# Patient Record
Sex: FEMALE | Race: WHITE | NOT HISPANIC OR LATINO | Employment: OTHER | URBAN - METROPOLITAN AREA
[De-identification: names, ages, dates, MRNs, and addresses within clinical notes are randomized per-mention and may not be internally consistent; named-entity substitution may affect disease eponyms.]

---

## 2018-02-28 DIAGNOSIS — M54.50 LUMBAGO: Primary | ICD-10-CM

## 2018-02-28 DIAGNOSIS — M79.7 FIBROMYALGIA: ICD-10-CM

## 2018-03-16 ENCOUNTER — CLINICAL SUPPORT (OUTPATIENT)
Dept: REHABILITATION | Facility: HOSPITAL | Age: 68
End: 2018-03-16
Attending: INTERNAL MEDICINE
Payer: MEDICARE

## 2018-03-16 DIAGNOSIS — M54.50 CHRONIC MIDLINE LOW BACK PAIN WITHOUT SCIATICA: ICD-10-CM

## 2018-03-16 DIAGNOSIS — G89.29 CHRONIC MIDLINE LOW BACK PAIN WITHOUT SCIATICA: ICD-10-CM

## 2018-03-16 DIAGNOSIS — R53.1 DECREASED STRENGTH: ICD-10-CM

## 2018-03-16 PROCEDURE — G8979 MOBILITY GOAL STATUS: HCPCS | Mod: CJ,PO

## 2018-03-16 PROCEDURE — G8978 MOBILITY CURRENT STATUS: HCPCS | Mod: CK,PO

## 2018-03-16 PROCEDURE — 97162 PT EVAL MOD COMPLEX 30 MIN: CPT | Mod: PO

## 2018-03-16 NOTE — PLAN OF CARE
"Physical Therapy Evaluation    Name: Tressa Quiroz  Clinic Number: [id number]      Diagnosis:   Encounter Diagnoses   Name Primary?    Decreased strength     Chronic midline low back pain without sciatica      Physician: Amelia Garcia*  Treatment Orders: PT Eval and Treat    Past Medical History:   Diagnosis Date    Allergy     AR (allergic rhinitis) 10/1/2012    Basal cell carcinoma of nasal tip 10/1/2012    Hair loss disorder 10/1/2012    Hypothyroidism 10/1/2012    Thyroid disease      Current Outpatient Prescriptions   Medication Sig    irbesartan (AVAPRO) 75 MG tablet     levothyroxine (TIROSINT) 13 mcg Cap Take 1 capsule by mouth every morning.    levothyroxine (TIROSINT) 25 mcg Cap Take 1 capsule by mouth every morning.     No current facility-administered medications for this visit.      Review of patient's allergies indicates:   Allergen Reactions    Vitamin d analogue      Joint pain         Evaluation Date: 3/16/18  Visit # authorized: 1/20  POC period: 3/16/18-5/11/18  G-code: 1/10          Subjective/History     Precautions: universal, ?spine precautions  Medical Diagnosis: fibromyalgia and lumbago  PMH/co-morbidities: L shoulder pain/upper back pain, osteoporosis, hypothyroid, insomnia, R hip pain, osteoporosis  PT Diagnosis: tenderness at low back, decreased strength, lack of appropriate HEP, decreased spinal mobility  Chief complaint: "I was told I have a collapsed disk and I want to know which exercises I can do safely.  History of Present Illness: Tressa is a 67 y.o. female that presents to [facility] secondary to back pain. Pt states she's had osteoporosis since 1999, and was taking medication for this. Pt denies injury. Pt states that her doctor told her 5th disc has collapsed. Pt thinks it's in her low back, and not in her thoracic spine. Pt states she is unsure if it's the disk or vertebrae, but she's only had an Xr. PT will follow up with MD for " notes and XR results, as pt is outside of [facility] system. Pt denies back pain, but states she would like to know what kind of exercises are safe for her to do. Pt reports her mobility and exercise tolerance has been limited by her insomnia, but the insomnia is improving and pt would like to be more active.    X-ray and MRI was taken and revealed: pt reports a collapsed 5th vertebrae, but unable to specify  Prior Therapy: Yes for her knee; pt found it helpful  Nutrition:  Normal    Social History: lives alone in a duplex, no stairs.  Place of Residence (Steps/Adaptations/Levels):  No stairs  Previous functional status includes: pt denies a recent change, but states she's not as active as she used to be  Current functional status:  Pt states that she is not very active with the exception of walking her dog.   She went to the gym for the first time in a while  Pt has a decrease ability to perform ADLs such as: washing her dog- she reports back pain/strain with leaning over the tub  Exercise routine prior to onset : walks her dog, lifts weights, stationary bike (pt used to go to the gym 2x/week)- hasn't really been since Nov/Dec. Pt reports she also used to go to yoga.  DME owned: none  Work:  Retired; pt used to run a                        Job description includes:  Retired; pt walks her dog. Pt states her insomnia has limited her ability to do things out of the house    Pain: no back pain; pt reports L shoulder pain (1-2/10)- pt reports occasional R hip pain (she states it's bone on bone in her hip)  Onset of pain /Mechanism of Onset:  n/a  Chief complaint:  Wants same exercises that are safe for her back  Radicular symptoms:  none  Aggravating factors:  Pt states she will get back pain if she is bent over for a prolonged time (ie washing her dog)  Easing factors:  N/a  Pain at current: denies hip or back pain at this time; 1-2/10 L shoulder pain.    Patient Goals: Pt would like exercises for her hip and back  that are safe to perform  No cultural, environmental, or spiritual barriers identified to treatment or learning.       Objective     Observation: pt received ambulating independently without a device, no major gait deviations noted. Pt is A+ O x 3, pleasant    Posture:  Rounded shoulder, forward head posture    Lumbar Range of Motion:    Degrees Pain   Flexion WNL    No        Extension WNL   no        Left Side Bending WNL No        Right Side Bending WNL No        Left rotation   WNL No        Right Rotation   7/8 range No             Lower Extremity Strength  Right LE  Left LE    Knee extension: 5/5 Knee extension: 5/5   Knee flexion: 5/5 Knee flexion: 5/5   Hip flexion: 5/5 Hip flexion: 4+/5   Hip extension:  5/5 Hip extension: 5/5   Hip abduction: 5/5 Hip abduction: 5/5   Hip adduction: 4+/5 Hip adduction 5/5   Ankle dorsiflexion: 5/5 Ankle dorsiflexion: 5/5   Ankle plantarflexion: 5/5 Ankle plantarflexion: 5/5   Hip IR (R/L): 5/5/ 4/5  Hip Er: (R/L): 5/5/5/5    Special Tests:  -Bridge test: - B  -SLR Test: -    Joint Mobility: WNL in B hips, not assessed at spine, as PT does not have pt's spinal XR results    Palpation: some tenderness to palpation over L5 spinous prcess    Sensation: light touch intact    Flexibility: good HS flexibility bilaterally     Functional Limitations Reports - G Codes  Category: Mobility  Tool: FOTO- back  Score: 59% (41% limitation)  Current: CK at least 40% < 60% impaired, limited or restricted  Goal: CJ at least 20% < 40% impaired, limited or restricted        PT Evaluation Completed? Yes  Discussed Plan of Care with patient: Yes    TREATMENT:  Pt was educated on activity modification (ie washing her dog seated, outside, and/or taking breaks from being bent over the tub while washing the dog). Pt was educated on spine precautions (to avoid excessive bending, twisting, and heavy lifting) to promote back stability. PT explained to pt that these may change once PT is able to see pt's  XR results. Pt provided with HEP of Bridge with Hip ADD, SLR, clamshell with OTB, and posterior pelvic tilt     Instructed pt. Regarding: Proper technique with all exercises. Pt demo good understanding of the education provided. Tressa demonstrated good return demonstration of activities.      Assessment     History  Co-morbidities and personal factors that may impact the plan of care Examination  Body Structures and Functions, activity limitations and participation restrictions that may impact the plan of care    Clinical Presentation   Co-morbidities:   difficulty sleeping and osteoporosis        Personal Factors:   coping style Body Regions:   back  lower extremities    Body Systems:    ROM  strength            Participation Restrictions:   none     Activity limitations:   Learning and applying knowledge  no deficits    General Tasks and Commands  no deficits    Communication  no deficits    Mobility  no deficits    Self care  no deficits    Domestic Life  no deficits    Interactions/Relationships  no deficits    Life Areas  no deficits    Community and Social Life  no deficits         Stable but unknown characteristics at this time                    moderate   moderate  moderate Decision Making/ Complexity Score:  moderate             This is a 67 y.o. female referred to outpatient physical therapy and presents with a medical diagnosis of lumbago and fibromyalgia and demonstrates limitations as described in the problem list. Pt denies back pain (apart from tenderness at spinous process and with activities like washing her dog), but she was told she has a collapsed disk and wants appropriate core, back and hip exercises to perform. Pt was unsure of XR results, so PT to follow up with referring MD for imaging results and most recent notes, so this can be factored into exercise prescription. Pt will benefit from physcial therapy services in order to maximize pain free functional independence. The following goals  were discussed with the patient and patient is in agreement with them as to be addressed in the treatment plan. Pt was given a HEP consisting of exercises listed above. Pt verbally understood the instructions as they were given and demonstrated proper form and technique during therapy. Pt was advised to perform these exercises free of pain, and to stop performing them if pain occurs. Will start PT 1x/week for 4 weeks with ultimate goal of PT being that pt feeling safe/confident to go to exercise independently at home or the gym    Medical necessity is demonstrated by the following IMPAIRMENTS/PROMBLEM LIST:    1)tenderness/back pain   2)Core/back weakness   3)Decreased spinal mobility   4)Decreased ability to participate in recreational activities   5) Lack of HEP, requires skilled supervision to complete and progress HEP          GOALS: Short Term Goals:  4 weeks  1.Report decreased    back    pain   to increase tolerance for washing her dog  2. Pt to increase R spinal rotation to WNL for improved functional mobility  3. Increased MMT 1/2 grade  to increase tolerance for ADL and recreational activities.  4. Pt to be compliant with appropriate activity modifications  5. Pt to tolerate HEP to improve ROM and independence with ADL's    Long Term Goals: 8 weeks  1.pt to demo proper body mechanics with bending/lifting to prevent future back pain/injuries  2.Increased MMT  for  1 grade  to increase tolerance for ADL and work activities.  3. Pt will report at CJ level (20-40% impaired) on  FOTO score to demonstrate decrease in disability and improvement in back pain.  4. Pt to report feeling confident to perform HEP independently at home or at the gym      Plan     Pt will be treated by physical therapy 1 time weekly for Pt Education, HEP, therapeutic exercises, neuromuscular re-education, therapeutic activities, manual therapy, joint mobilizations, and modalities PRN to achieve established goals. Tressa may at times be  seen by a PTA as part of the Rehab Team.     Cont PT for 8 weeks.      Birgit Oliver, PT  03/16/2018      I certify the need for these services furnished under this plan of treatment and while under my care.______________________________ Physician/Referring Practitioner  Date of Signature

## 2018-04-09 ENCOUNTER — CLINICAL SUPPORT (OUTPATIENT)
Dept: REHABILITATION | Facility: HOSPITAL | Age: 68
End: 2018-04-09
Attending: INTERNAL MEDICINE
Payer: MEDICARE

## 2018-04-09 DIAGNOSIS — R53.1 DECREASED STRENGTH: ICD-10-CM

## 2018-04-09 DIAGNOSIS — G89.29 CHRONIC MIDLINE LOW BACK PAIN WITHOUT SCIATICA: ICD-10-CM

## 2018-04-09 DIAGNOSIS — M54.50 CHRONIC MIDLINE LOW BACK PAIN WITHOUT SCIATICA: ICD-10-CM

## 2018-04-09 PROCEDURE — 97110 THERAPEUTIC EXERCISES: CPT | Mod: PO

## 2018-04-09 NOTE — PLAN OF CARE
"                                                    Physical Therapy Progress Note     Name: Tressa Quiroz  Clinic Number: [id number]  Diagnosis:   Encounter Diagnoses   Name Primary?    Decreased strength     Chronic midline low back pain without sciatica      Physician: Amelia Garcia*  Treatment Orders: PT Eval and Treat  Past Medical History:   Diagnosis Date    Allergy     AR (allergic rhinitis) 10/1/2012    Basal cell carcinoma of nasal tip 10/1/2012    Hair loss disorder 10/1/2012    Hypothyroidism 10/1/2012    Thyroid disease        Precautions: standard  Visit #: 2/20  Date of Eval: 3/16/18  Plan of Care Expiration: 5/11/18  G-CODE  2/10     Time in: 13:00  Time out: 14:00      Subjective   Pt reports: Pt states,"I have to apologize I forgot when my appointments, so I missed 4 of them"  Pt states, "my R hip hurts sometimes when I Lie down, and I put a pillow under my leg, and it is better."  Pain Scale:  Pt denies back pain. Pt reports R hip pain; but denies pain at this time.    Objective     Patient received individual therapy to increase strength, endurance, ROM, flexibility, posture and core stabilization with activities as follows:     Tressa received therapeutic exercises to develop strength, endurance, ROM, flexibility, posture and core stabilization for 50 minutes including:         Lower Extremity Strength  Right LE   Left LE     Knee extension: 5/5 Knee extension: 5/5   Knee flexion: 5/5 Knee flexion: 5/5   Hip flexion: 4+/5 Hip flexion: 5/5   Hip extension:  5/5 Hip extension: 5/5   Hip abduction: 5/5 Hip abduction: 5/5   Hip adduction: 5/5 Hip adduction 5/5   Ankle dorsiflexion: 5/5 Ankle dorsiflexion: 5/5   Ankle plantarflexion: 5/5 Ankle plantarflexion: 5/5   Hip IR (R/L): 5/5/ 4+/5  Hip Er: (R/L): 5/5/5/5    Recumbent bike, 8 min, Level 3  2 x 10 reps of posterior pelvic tilts, 3" holds  2 x 10 reps of supine marching with pelvic tilt  2 x 10 reps of Hip ADD squeeze with " "bridge, 3" holds  2 x 10 reps of resisted alternating hip ABD in hook-lying, OTB   10 reps of SLR c/ hip ADD bias  10 reps of SLR to Hip Abduction  DKTC c/ pull down, pink sports cord, 2 x 10 reps  In prone, 5 reps of single Ue, then single LE extension; followed by 10 reps of Alt UE and LE extension  Child's pose, 1 min  Lateral child's pose, 30"/side  Low plank on knees, 3 x 10-15" holds  Figure-4 stretch, 1 min/side  Hip flexor stretch off table, 1 min/side  2 x 10 reps of leg press on 80#  10 reps of hip flexion rotary, 25#    Written Home Exercises: Bolded exercises  Pt demo good understanding of the education provided. Tressa demonstrated good return demonstration of activities.     Education provided re: POC, HEP    Pt has no cultural, educational or language barriers to learning provided.    Assessment   Pt tolerated session well without increase in symptoms. Pt states she has missed sessions because she forgot about them, but that exercises at home have been helpful and pt would like to continue with therapy. Pt has made some improvement in LE strength since evaluation. Pt is motivated to continue making gains in strength and endurance. Will Work towards remaining and updated goals, 1x/week for 3 weeks.    Pt prognosis is Good. Pt will continue to benefit from skilled outpatient physical therapy to address the deficits listed in the problem list, provide pt/family education and to maximize pt's level of independence in the home and community environment.     Anticipated barriers to physical therapy:     Medical necessity is demonstrated by the following IMPAIRMENTS/PROBLEM LIST:      1)tenderness/back pain              2)Core/back weakness              3)Decreased spinal mobility              4)Decreased ability to participate in recreational activities              5) Lack of HEP, requires skilled supervision to complete and progress HEP                  Pt's spiritual, cultural and educational needs " considered and pt agreeable to plan of care and GOALS as stated below:      GOALS: Short Term Goals:  4 weeks  1.Report decreased    back    pain   to increase tolerance for washing her dog  2. Pt to increase R spinal rotation to WNL for improved functional mobility  3. Increased MMT 1/2 grade  to increase tolerance for ADL and recreational activities.- Goal met  4. Pt to be compliant with appropriate activity modifications  5. Pt to tolerate HEP to improve ROM and independence with ADL's     Long Term Goals: 8 weeks  1.pt to demo proper body mechanics with bending/lifting to prevent future back pain/injuries  2.Increased MMT  for  1 grade  to increase tolerance for ADL and work activities.  3. Pt will report at CJ level (20-40% impaired) on  FOTO score to demonstrate decrease in disability and improvement in back pain.  4. Pt to report feeling confident to perform HEP independently at home or at the gym            Plan   Continue PT 1x weekly under established Plan of Care, with treatment to include: pt education, HEP, therapeutic exercises, neuromuscular re-education/balance exercises, therapeutic activities, joint mobilizations, and modalities PRN, to work towards established goals. Pt may be seen by PTA to carry out plan of care.     Birgit Oliver, PT   04/09/2018

## 2018-04-09 NOTE — PROGRESS NOTES
"                                                    Physical Therapy Progress Note     Name: Tressa Quiroz  Clinic Number: [id number]  Diagnosis:   Encounter Diagnoses   Name Primary?    Decreased strength     Chronic midline low back pain without sciatica      Physician: Amelia Garcia*  Treatment Orders: PT Eval and Treat  Past Medical History:   Diagnosis Date    Allergy     AR (allergic rhinitis) 10/1/2012    Basal cell carcinoma of nasal tip 10/1/2012    Hair loss disorder 10/1/2012    Hypothyroidism 10/1/2012    Thyroid disease        Precautions: standard  Visit #: 2/20  Date of Eval: 3/16/18  Plan of Care Expiration: 5/11/18  G-CODE  2/10     Time in: 13:00  Time out: 1400      Subjective   Pt reports: Pt states,"I have to apologize I forgot when my appointments, so I missed 4 of them"  Pt states, "my R hip hurts sometimes when I Lie down, and I put a pillow under my leg, and it is better."  Pain Scale:  Pt denies back pain. Pt reports R hip pain; but denies pain at this time.    Objective     Patient received individual therapy to increase strength, endurance, ROM, flexibility, posture and core stabilization with activities as follows:     Tressa received therapeutic exercises to develop strength, endurance, ROM, flexibility, posture and core stabilization for 50 minutes including:         Lower Extremity Strength  Right LE   Left LE     Knee extension: 5/5 Knee extension: 5/5   Knee flexion: 5/5 Knee flexion: 5/5   Hip flexion: 4+/5 Hip flexion: 5/5   Hip extension:  5/5 Hip extension: 5/5   Hip abduction: 5/5 Hip abduction: 5/5   Hip adduction: 5/5 Hip adduction 5/5   Ankle dorsiflexion: 5/5 Ankle dorsiflexion: 5/5   Ankle plantarflexion: 5/5 Ankle plantarflexion: 5/5   Hip IR (R/L): 5/5/ 4+/5  Hip Er: (R/L): 5/5/5/5    Recumbent bike, 8 min, Level 3  2 x 10 reps of posterior pelvic tilts, 3" holds  2 x 10 reps of supine marching with pelvic tilt  2 x 10 reps of Hip ADD squeeze with " "bridge, 3" holds  2 x 10 reps of resisted alternating hip ABD in hook-lying, OTB   10 reps of SLR c/ hip ADD bias  10 reps of SLR to Hip Abduction  DKTC c/ pull down, pink sports cord, 2 x 10 reps  In prone, 5 reps of single Ue, then single LE extension; followed by 10 reps of Alt UE and LE extension  Child's pose, 1 min  Lateral child's pose, 30"/side  Low plank on knees, 3 x 10-15" holds  Figure-4 stretch, 1 min/side  Hip flexor stretch off table, 1 min/side  2 x 10 reps of leg press on 80#  10 reps of hip flexion rotary, 25#    Written Home Exercises: Bolded exercises  Pt demo good understanding of the education provided. Tressa demonstrated good return demonstration of activities.     Education provided re: POC, HEP    Pt has no cultural, educational or language barriers to learning provided.    Assessment   Pt tolerated session well without increase in symptoms. Pt states she has missed sessions because she forgot about them, but that exercises at home have been helpful and pt would like to continue with therapy. Pt has made some improvement in LE strength since evaluation. Pt is motivated to continue making gains in strength and endurance. Will Work towards remaining and updated goals, 1x/week for 3 weeks.    Pt prognosis is Good. Pt will continue to benefit from skilled outpatient physical therapy to address the deficits listed in the problem list, provide pt/family education and to maximize pt's level of independence in the home and community environment.     Anticipated barriers to physical therapy:     Medical necessity is demonstrated by the following IMPAIRMENTS/PROBLEM LIST:      1)tenderness/back pain              2)Core/back weakness              3)Decreased spinal mobility              4)Decreased ability to participate in recreational activities              5) Lack of HEP, requires skilled supervision to complete and progress HEP                  Pt's spiritual, cultural and educational needs " considered and pt agreeable to plan of care and GOALS as stated below:      GOALS: Short Term Goals:  4 weeks  1.Report decreased    back    pain   to increase tolerance for washing her dog  2. Pt to increase R spinal rotation to WNL for improved functional mobility  3. Increased MMT 1/2 grade  to increase tolerance for ADL and recreational activities.- Goal met  4. Pt to be compliant with appropriate activity modifications  5. Pt to tolerate HEP to improve ROM and independence with ADL's     Long Term Goals: 8 weeks  1.pt to demo proper body mechanics with bending/lifting to prevent future back pain/injuries  2.Increased MMT  for  1 grade  to increase tolerance for ADL and work activities.  3. Pt will report at CJ level (20-40% impaired) on  FOTO score to demonstrate decrease in disability and improvement in back pain.  4. Pt to report feeling confident to perform HEP independently at home or at the gym            Plan   Continue PT 1x weekly under established Plan of Care, with treatment to include: pt education, HEP, therapeutic exercises, neuromuscular re-education/balance exercises, therapeutic activities, joint mobilizations, and modalities PRN, to work towards established goals. Pt may be seen by PTA to carry out plan of care.     Birgit Oliver, PT   04/09/2018

## 2018-04-16 ENCOUNTER — CLINICAL SUPPORT (OUTPATIENT)
Dept: REHABILITATION | Facility: HOSPITAL | Age: 68
End: 2018-04-16
Attending: INTERNAL MEDICINE
Payer: MEDICARE

## 2018-04-16 DIAGNOSIS — G89.29 CHRONIC MIDLINE LOW BACK PAIN WITHOUT SCIATICA: ICD-10-CM

## 2018-04-16 DIAGNOSIS — M54.50 CHRONIC MIDLINE LOW BACK PAIN WITHOUT SCIATICA: ICD-10-CM

## 2018-04-16 DIAGNOSIS — R53.1 DECREASED STRENGTH: ICD-10-CM

## 2018-04-16 PROCEDURE — 97110 THERAPEUTIC EXERCISES: CPT | Mod: PO

## 2018-04-16 NOTE — PROGRESS NOTES
"                                                    Physical Therapy Progress Note     Name: Tressa Quiroz  Clinic Number: [id number]  Diagnosis:   Encounter Diagnoses   Name Primary?    Decreased strength     Chronic midline low back pain without sciatica      Physician: Amelia Garcia*  Treatment Orders: PT Eval and Treat  Past Medical History:   Diagnosis Date    Allergy     AR (allergic rhinitis) 10/1/2012    Basal cell carcinoma of nasal tip 10/1/2012    Hair loss disorder 10/1/2012    Hypothyroidism 10/1/2012    Thyroid disease        Precautions: standard  Visit #: 3/20  Date of Eval: 3/16/18  Plan of Care Expiration: 5/11/18  G-CODE  3/10     Time in: 1:05 PM  Time out: 1:55 PM      Subjective     Pt reports: Pt states,"I'm not doing as much as I should be."  Pt reports she hasn't gotten into the habit of doing her exercises like she should be.  Pt reports her back was hurting for a few days.  Pt reports she would lie on her R side with a pillow under her hip and this seemed to help so she was able to sleep.      Pain Scale:  Pt denies back pain. Pt reports R hip pain; but denies pain at this time.    Objective     Patient received individual therapy to increase strength, endurance, ROM, flexibility, posture and core stabilization with activities as follows:     Tressa received therapeutic exercises to develop strength, endurance, ROM, flexibility, posture and core stabilization for 50 minutes including:         Recumbent bike, 8 min, Level 3  2 x 10 reps of posterior pelvic tilts, 3" holds  2 x 10 reps of supine marching with pelvic tilt  2 x 10 reps of Hip ADD squeeze with bridge, 3" holds  3 x 10 reps of resisted alternating hip ABD in hook-lying, OTB   15 reps of SLR c/ hip ADD bias  15 reps of SLR to Hip Abduction  DKTC with theraball c/ pull down, pink sports cord, 2 x 10 reps  In prone, 5 reps of single Ue, then single LE extension; followed by 10 reps of Alt UE and LE " "extension  Child's pose, 1 min  Lateral child's pose, 30"/side  Low plank on knees, 3 x 10-15" holds  Figure-4 stretch, 1 min/side  Hip flexor stretch off table, 1 min/side  Shuttle with B LE 3.5 bands, 30 reps    Pt received 1 on 1 therapeutic exercise for 25 minutes    Not performed:  2 x 10 reps of leg press on 80#  10 reps of hip flexion rotary, 25#    Written Home Exercises: Bolded exercises  Pt demo good understanding of the education provided. Tressa demonstrated good return demonstration of activities.     Education provided re: POC, HEP    Pt has no cultural, educational or language barriers to learning provided.    Assessment   Pt tolerated session well without increase in symptoms. Pt was progressed in a few exercises without an increase in symptoms.  Will continue to progress as tolerated.    Pt prognosis is Good. Pt will continue to benefit from skilled outpatient physical therapy to address the deficits listed in the problem list, provide pt/family education and to maximize pt's level of independence in the home and community environment.     Anticipated barriers to physical therapy:     Medical necessity is demonstrated by the following IMPAIRMENTS/PROBLEM LIST:      1)tenderness/back pain              2)Core/back weakness              3)Decreased spinal mobility              4)Decreased ability to participate in recreational activities              5) Lack of HEP, requires skilled supervision to complete and progress HEP                  Pt's spiritual, cultural and educational needs considered and pt agreeable to plan of care and GOALS as stated below:      GOALS: Short Term Goals:  4 weeks  1.Report decreased    back    pain   to increase tolerance for washing her dog  2. Pt to increase R spinal rotation to WNL for improved functional mobility  3. Increased MMT 1/2 grade  to increase tolerance for ADL and recreational activities.- Goal met  4. Pt to be compliant with appropriate activity " modifications  5. Pt to tolerate HEP to improve ROM and independence with ADL's     Long Term Goals: 8 weeks  1.pt to demo proper body mechanics with bending/lifting to prevent future back pain/injuries  2.Increased MMT  for  1 grade  to increase tolerance for ADL and work activities.  3. Pt will report at CJ level (20-40% impaired) on  FOTO score to demonstrate decrease in disability and improvement in back pain.  4. Pt to report feeling confident to perform HEP independently at home or at the gym            Plan   Continue PT 1x weekly under established Plan of Care, with treatment to include: pt education, HEP, therapeutic exercises, neuromuscular re-education/balance exercises, therapeutic activities, joint mobilizations, and modalities PRN, to work towards established goals. Pt may be seen by PTA to carry out plan of care.     Julianna Murillo, ROSELINE   04/16/2018

## 2018-04-23 ENCOUNTER — CLINICAL SUPPORT (OUTPATIENT)
Dept: REHABILITATION | Facility: HOSPITAL | Age: 68
End: 2018-04-23
Attending: INTERNAL MEDICINE
Payer: MEDICARE

## 2018-04-23 DIAGNOSIS — M54.50 CHRONIC MIDLINE LOW BACK PAIN WITHOUT SCIATICA: ICD-10-CM

## 2018-04-23 DIAGNOSIS — R53.1 DECREASED STRENGTH: ICD-10-CM

## 2018-04-23 DIAGNOSIS — G89.29 CHRONIC MIDLINE LOW BACK PAIN WITHOUT SCIATICA: ICD-10-CM

## 2018-04-23 PROCEDURE — 97110 THERAPEUTIC EXERCISES: CPT | Mod: PO

## 2018-04-23 NOTE — PROGRESS NOTES
"                                                    Physical Therapy Progress Note     Name: Tressa Quiroz  Clinic Number: [id number]  Diagnosis:   Encounter Diagnoses   Name Primary?    Decreased strength     Chronic midline low back pain without sciatica      Physician: Amelia Garcia*  Treatment Orders: PT Eval and Treat  Past Medical History:   Diagnosis Date    Allergy     AR (allergic rhinitis) 10/1/2012    Basal cell carcinoma of nasal tip 10/1/2012    Hair loss disorder 10/1/2012    Hypothyroidism 10/1/2012    Thyroid disease        Precautions: standard  Visit #: 4/20  Date of Eval: 3/16/18  Plan of Care Expiration: 5/11/18  G-CODE  4/10     Time in: 1:08 PM  Time out: 2:00      Subjective     Pt reports: Pt without new complaint  Pain Scale:  Pt denies back pain. Pt reports R hip pain; but denies pain at this time.    Objective     Patient received individual therapy to increase strength, endurance, ROM, flexibility, posture and core stabilization with activities as follows:     Tressa received therapeutic exercises to develop strength, endurance, ROM, flexibility, posture and core stabilization for 45 minutes including:     Upright bike, 6 min, Level 4  10 reps of posterior pelvic tilts, 5" holds  2 x 10 reps of supine marching with pelvic tilt  2 x 10 reps of SLR, 1#  DKTC with theraball c/ pull down, pink sports cord, 2 x 10 reps  2 x 10 reps of prone hip extension  In quadruped, 2 x 10 reps of alternating UE extension  Child's pose, 2x 1 min  Clamshell, plus, 15 reps/side  Low plank on knees, 3 x 10-15" holds  Shuttle with B LE 3.5 bands, 20 reps  Standing HS stretch, 1 min/side            Not performed:  2 x 10 reps of leg press on 80#  10 reps of hip flexion rotary, 25#  2 x 10 reps of Hip ADD squeeze with bridge, 3" holds  3 x 10 reps of resisted alternating hip ABD in hook-lying, OTB   15 reps of SLR c/ hip ADD bias  15 reps of SLR to Hip Abduction  Lateral child's pose, " "30"/side  Figure-4 stretch, 1 min/side  Hip flexor stretch off table, 1 min/side    Written Home Exercises: low plank on knees, quadruped alt UE reach, clamshell level 2  Pt demo good understanding of the education provided. Tressa demonstrated good return demonstration of activities.     Education provided re: POC, HEP    Pt has no cultural, educational or language barriers to learning provided.    Assessment   Pt tolerated session well without increase in symptoms. Pt was progressed in a few exercises without an increase in symptoms.  Will continue to progress as tolerated.    Pt prognosis is Good. Pt will continue to benefit from skilled outpatient physical therapy to address the deficits listed in the problem list, provide pt/family education and to maximize pt's level of independence in the home and community environment.     Anticipated barriers to physical therapy:     Medical necessity is demonstrated by the following IMPAIRMENTS/PROBLEM LIST:      1)tenderness/back pain              2)Core/back weakness              3)Decreased spinal mobility              4)Decreased ability to participate in recreational activities              5) Lack of HEP, requires skilled supervision to complete and progress HEP                  Pt's spiritual, cultural and educational needs considered and pt agreeable to plan of care and GOALS as stated below:      GOALS: Short Term Goals:  4 weeks  1.Report decreased    back    pain   to increase tolerance for washing her dog  2. Pt to increase R spinal rotation to WNL for improved functional mobility  3. Increased MMT 1/2 grade  to increase tolerance for ADL and recreational activities.- Goal met  4. Pt to be compliant with appropriate activity modifications  5. Pt to tolerate HEP to improve ROM and independence with ADL's     Long Term Goals: 8 weeks  1.pt to demo proper body mechanics with bending/lifting to prevent future back pain/injuries  2.Increased MMT  for  1 grade  to " increase tolerance for ADL and work activities.  3. Pt will report at CJ level (20-40% impaired) on  FOTO score to demonstrate decrease in disability and improvement in back pain.  4. Pt to report feeling confident to perform HEP independently at home or at the gym            Plan   Continue PT 1x weekly under established Plan of Care, with treatment to include: pt education, HEP, therapeutic exercises, neuromuscular re-education/balance exercises, therapeutic activities, joint mobilizations, and modalities PRN, to work towards established goals. Pt may be seen by PTA to carry out plan of care.     Birgit Oliver, PT   04/23/2018

## 2018-04-30 ENCOUNTER — CLINICAL SUPPORT (OUTPATIENT)
Dept: REHABILITATION | Facility: HOSPITAL | Age: 68
End: 2018-04-30
Attending: INTERNAL MEDICINE
Payer: MEDICARE

## 2018-04-30 DIAGNOSIS — G89.29 CHRONIC MIDLINE LOW BACK PAIN WITHOUT SCIATICA: ICD-10-CM

## 2018-04-30 DIAGNOSIS — M54.50 CHRONIC MIDLINE LOW BACK PAIN WITHOUT SCIATICA: ICD-10-CM

## 2018-04-30 DIAGNOSIS — R53.1 DECREASED STRENGTH: ICD-10-CM

## 2018-04-30 PROCEDURE — G8980 MOBILITY D/C STATUS: HCPCS | Mod: CI,PO

## 2018-04-30 PROCEDURE — 97110 THERAPEUTIC EXERCISES: CPT | Mod: PO

## 2018-04-30 PROCEDURE — G8979 MOBILITY GOAL STATUS: HCPCS | Mod: CJ,PO

## 2018-04-30 NOTE — PROGRESS NOTES
"                                                    Physical Therapy Progress Note     Name: Tressa Quiroz  Clinic Number: [id number]  Diagnosis:   Encounter Diagnoses   Name Primary?    Decreased strength     Chronic midline low back pain without sciatica      Physician: Amelia Garcia*  Treatment Orders: PT Eval and Treat  Past Medical History:   Diagnosis Date    Allergy     AR (allergic rhinitis) 10/1/2012    Basal cell carcinoma of nasal tip 10/1/2012    Hair loss disorder 10/1/2012    Hypothyroidism 10/1/2012    Thyroid disease        Precautions: standard  Visit #:   Date of Eval: 3/16/18  Plan of Care Expiration: 18  G-CODE  5/10     Time in: 1:00 PM  Time out: 2:00pm      Subjective     Pt reports: Pt without new complaint. Pt reports R hip sensitivity especially if she sleeps on her R side.  Pain Scale:  Pt denies back pain.     Objective     Patient received individual therapy to increase strength, endurance, ROM, flexibility, posture and core stabilization with activities as follows:     Tressa received therapeutic exercises to develop strength, endurance, ROM, flexibility, posture and core stabilization for 50 minutes includinmin 1:1    Lower Extremity Strength  Right LE   Left LE     Knee extension: 5/5 Knee extension: 5/5   Knee flexion: 5/5 Knee flexion: 5/5   Hip flexion: 5/5 Hip flexion: 5/5   Hip extension:  5/5 Hip extension: 5/5   Hip abduction: 5/5 Hip abduction: 5/5   Hip adduction: 5/5 Hip adduction 5/5   Ankle dorsiflexion: 5/5 Ankle dorsiflexion: 5/5   Ankle plantarflexion: 5/5 Ankle plantarflexion: 5/5   Hip IR (R/L): 5/5/ 5/5  Hip Er: (R/L): 5/5/5/5      Recumbent bike, 8 min, Level 4  2 x 10 reps of supine marching with pelvic tilt  2 x 10 reps of SLR, 2#  Bridge c/ LE's on  Ball, 20 reps, 3" holds  Bird dogs, 2 x 10 reps  Side plank, modified, 5 x 10 reps  Neil pose, 3 x 30"  Low plank on knees, 5 x 10"      Written Home Exercises: low plank on " knees, bird dog, side plank on knees. Pt also educated on how to use pillow and/or LE supported seated for improved posture/ack support, as well as towel roll along spine in sitting or supine for improved posture.  Pt demo good understanding of the education provided. Tressa demonstrated good return demonstration of activities.     Education provided re: POC, HEP    Pt has no cultural, educational or language barriers to learning provided.        Functional Limitations Reports - G Codes  Category: Mobility   Tool: FOTO- Back  Score: 83 % (17% limitation)  Current/Dicscharge: CI at least 1% but less than 20% impaired, limited or restricted  Goal: CJ at least 20% < 40% impaired, limited or restricted        Assessment   Pt tolerated session well without increase in symptoms. Pt able to tolerate progression of core exercises that avoid extreme twisting or flexing for spinal integrity. Pt has met goals as noted below, most notable are her increase in strength and FOTO score. Pt is appropriate for discharge at this time.     Anticipated barriers to physical therapy:                  Pt's spiritual, cultural and educational needs considered and pt agreeable to plan of care and GOALS as stated below:      GOALS: Short Term Goals:  4 weeks  1.Report decreased    back    pain   to increase tolerance for washing her dog  2. Pt to increase R spinal rotation to WNL for improved functional mobility  3. Increased MMT 1/2 grade  to increase tolerance for ADL and recreational activities.- Goal met  4. Pt to be compliant with appropriate activity modifications Goal met   5. Pt to tolerate HEP to improve ROM and independence with ADL's Goal met     Long Term Goals: 8 weeks  1.pt to demo proper body mechanics with bending/lifting to prevent future back pain/injuries  2.Increased MMT  for  1 grade  to increase tolerance for ADL and work activities.Goal met  3. Pt will report at CJ level (20-40% impaired) on  FOTO score to demonstrate  decrease in disability and improvement in back pain -Goal exceeded.  4. Pt to report feeling confident to perform HEP independently at home or at the gym Goal met           PHYSICAL THERAPY DISCHARGE SUMMARY     Status Towards Goals Met: See above    Goals Not achieved and why:   Did not review lifting technique, but did review avoiding excess twisting, flexing        Discharge reason : Met majority of goals    PLAN   This patient is discharged from Outpatient Physical Therapy Services.     Birgit Oliver, PT  04/30/2018

## 2019-03-28 ENCOUNTER — OFFICE VISIT (OUTPATIENT)
Dept: INTERNAL MEDICINE | Facility: CLINIC | Age: 69
End: 2019-03-28
Payer: MEDICARE

## 2019-03-28 VITALS
RESPIRATION RATE: 20 BRPM | BODY MASS INDEX: 23.03 KG/M2 | DIASTOLIC BLOOD PRESSURE: 80 MMHG | HEIGHT: 60 IN | WEIGHT: 117.31 LBS | SYSTOLIC BLOOD PRESSURE: 166 MMHG | TEMPERATURE: 98 F | HEART RATE: 68 BPM

## 2019-03-28 DIAGNOSIS — Z13.6 ENCOUNTER FOR SCREENING FOR CARDIOVASCULAR DISORDERS: ICD-10-CM

## 2019-03-28 DIAGNOSIS — Z11.4 SCREENING FOR HIV (HUMAN IMMUNODEFICIENCY VIRUS): ICD-10-CM

## 2019-03-28 DIAGNOSIS — Z12.31 BREAST CANCER SCREENING BY MAMMOGRAM: ICD-10-CM

## 2019-03-28 DIAGNOSIS — E60 ZINC DEFICIENCY: ICD-10-CM

## 2019-03-28 DIAGNOSIS — D64.9 ANEMIA, UNSPECIFIED TYPE: ICD-10-CM

## 2019-03-28 DIAGNOSIS — E03.8 HYPOTHYROIDISM DUE TO HASHIMOTO'S THYROIDITIS: ICD-10-CM

## 2019-03-28 DIAGNOSIS — H35.30 MACULAR DEGENERATION, UNSPECIFIED LATERALITY, UNSPECIFIED TYPE: ICD-10-CM

## 2019-03-28 DIAGNOSIS — E59 SELENIUM DEFICIENCY: ICD-10-CM

## 2019-03-28 DIAGNOSIS — Z11.59 ENCOUNTER FOR HEPATITIS C SCREENING TEST FOR LOW RISK PATIENT: ICD-10-CM

## 2019-03-28 DIAGNOSIS — E06.3 HYPOTHYROIDISM DUE TO HASHIMOTO'S THYROIDITIS: ICD-10-CM

## 2019-03-28 DIAGNOSIS — E55.9 VITAMIN D DEFICIENCY: ICD-10-CM

## 2019-03-28 DIAGNOSIS — Z12.11 SCREENING FOR COLON CANCER: ICD-10-CM

## 2019-03-28 DIAGNOSIS — Z00.00 ENCOUNTER FOR PREVENTIVE HEALTH EXAMINATION: Primary | ICD-10-CM

## 2019-03-28 DIAGNOSIS — I10 ESSENTIAL HYPERTENSION: ICD-10-CM

## 2019-03-28 PROBLEM — M35.00 SJOGREN'S DISEASE: Status: ACTIVE | Noted: 2019-03-28

## 2019-03-28 PROCEDURE — 99999 PR PBB SHADOW E&M-EST. PATIENT-LVL IV: ICD-10-PCS | Mod: PBBFAC,HCNC,, | Performed by: HOSPITALIST

## 2019-03-28 PROCEDURE — 99999 PR PBB SHADOW E&M-EST. PATIENT-LVL IV: CPT | Mod: PBBFAC,HCNC,, | Performed by: HOSPITALIST

## 2019-03-28 PROCEDURE — 3079F PR MOST RECENT DIASTOLIC BLOOD PRESSURE 80-89 MM HG: ICD-10-PCS | Mod: HCNC,CPTII,S$GLB, | Performed by: HOSPITALIST

## 2019-03-28 PROCEDURE — 3079F DIAST BP 80-89 MM HG: CPT | Mod: HCNC,CPTII,S$GLB, | Performed by: HOSPITALIST

## 2019-03-28 PROCEDURE — 99204 OFFICE O/P NEW MOD 45 MIN: CPT | Mod: HCNC,S$GLB,, | Performed by: HOSPITALIST

## 2019-03-28 PROCEDURE — 3077F PR MOST RECENT SYSTOLIC BLOOD PRESSURE >= 140 MM HG: ICD-10-PCS | Mod: HCNC,CPTII,S$GLB, | Performed by: HOSPITALIST

## 2019-03-28 PROCEDURE — 3077F SYST BP >= 140 MM HG: CPT | Mod: HCNC,CPTII,S$GLB, | Performed by: HOSPITALIST

## 2019-03-28 PROCEDURE — 99204 PR OFFICE/OUTPT VISIT, NEW, LEVL IV, 45-59 MIN: ICD-10-PCS | Mod: HCNC,S$GLB,, | Performed by: HOSPITALIST

## 2019-03-28 RX ORDER — LEVOTHYROXINE SODIUM 88 UG/1
88 TABLET ORAL
COMMUNITY
Start: 2019-03-14 | End: 2019-04-03

## 2019-03-28 RX ORDER — AMLODIPINE BESYLATE 5 MG/1
5 TABLET ORAL DAILY
Qty: 30 TABLET | Refills: 3 | Status: SHIPPED | OUTPATIENT
Start: 2019-03-28 | End: 2020-01-03 | Stop reason: ALTCHOICE

## 2019-03-28 NOTE — PROGRESS NOTES
"Subjective:     @Patient ID: Tressa Quiroz is a 68 y.o. female.    Chief Complaint: Annual Exam and Establish Care    HPI  67yo F presents for preventive health exam. Pt is new to me. She reports she has been following with an ob/gyn Dr Chidi Gusman[facility] [id number] [place] [facility] who managed her autoimmune thyroid disease. She reports she would like to have labs testing minerals and vitamins as she states she has had multiple deficiencies that he was treating her for. She also reports she is on naltrexone for "adrenal fatigue." She also request to have thyroid antibodies ordered. Reports she has hashimoto's. She is retired and has 1 child. States her ob/gyn for female exam is Dr Sanchez. She has not had a pcp in [facility]. She is a vegetarian and exercises regularly. Has HTN but is not taking any meds currently.     Lipid disorders/ASCVD risk (ages >/= 45 or >/= 20 if increased risk ): ordered    DM (>45y yearly or if obese, HTN): A1c    Hepatitis C (one time if born between 3409-6725): ordered   STD screening:   Eye exam: yearly eye exam   Breast Cancer (40-50y discretion of pt, 50-74y every 1-2 years): Mammogram Due    Cervical Cancer (Pap Smear ages 21-65 every 3 years or Pap + HPV q5 years after 30 years of age):     Colorectal Cancer (normal risk 50-75yr): Colonoscopy: Due   Lung Cancer (low dose CT for ages 55-80 if 30 pack year history and currently smoking/ quit within 15 years): n/a   DEXA (F>64 yo, M >69yo, M&F 50-68 yo with risk factors (smoking, previous fx, wt <70kg; etoh abuse, chronic steroids, RA)): pt reports done at o/s facility. Will obtain records    Vaccines:   Influenza (yearly) declines   Tetanus (every 10 yrs - 1st tdap): last 2005 declines.    PPSV23(>64yo or <65 w/ lung dz, smoking, DM) declines   PCV13 (> 65 or <65 w/ immunocompromised) declines  Zoster (>61yo) declines     Exercise:  Walking daily.   Diet:  vegetarian      Past Medical History:   Diagnosis Date    Allergy  "    AR (allergic rhinitis) 10/1/2012    Basal cell carcinoma of nasal tip 10/1/2012    Hair loss disorder 10/1/2012    Hypertension     Hypothyroidism 10/1/2012    Thyroid disease      Past Surgical History:   Procedure Laterality Date    BUNIONECTOMY       Family History   Problem Relation Age of Onset    Hyperlipidemia Father     Cancer Brother         lymphoma    Stroke Maternal Aunt      Social History     Socioeconomic History    Marital status:      Spouse name: Not on file    Number of children: 1    Years of education: Not on file    Highest education level: Not on file   Occupational History     Comment: Retired  worker   Social Needs    Financial resource strain: Not on file    Food insecurity:     Worry: Not on file     Inability: Not on file    Transportation needs:     Medical: Not on file     Non-medical: Not on file   Tobacco Use    Smoking status: Never Smoker    Tobacco comment: 1 daughter, [employer].   Substance and Sexual Activity    Alcohol use: No    Drug use: No    Sexual activity: Not on file   Lifestyle    Physical activity:     Days per week: Not on file     Minutes per session: Not on file    Stress: Not on file   Relationships    Social connections:     Talks on phone: Not on file     Gets together: Not on file     Attends Congregation service: Not on file     Active member of club or organization: Not on file     Attends meetings of clubs or organizations: Not on file     Relationship status: Not on file    Intimate partner violence:     Fear of current or ex partner: Not on file     Emotionally abused: Not on file     Physically abused: Not on file     Forced sexual activity: Not on file   Other Topics Concern    Not on file   Social History Narrative    Not on file     Review of patient's allergies indicates:   Allergen Reactions    Vitamin d analogue      Joint pain. Only with vitamin D2        Current Outpatient Medications:      levothyroxine (SYNTHROID) 88 MCG tablet, Take 88 mcg by mouth before breakfast. , Disp: , Rfl:     naltrexone capsule, TAKE ONE CAPSULE BY MOUTH ONCE DAILY, Disp: , Rfl: 99          Review of Systems   Constitutional: Negative for chills and fever.   HENT: Negative for congestion and sore throat.    Eyes: Negative for pain and visual disturbance.   Respiratory: Negative for cough and shortness of breath.    Cardiovascular: Negative for chest pain and leg swelling.   Gastrointestinal: Negative for abdominal pain, nausea and vomiting.   Endocrine: Negative for polydipsia and polyuria.   Genitourinary: Negative for difficulty urinating and dysuria.   Musculoskeletal: Negative for arthralgias and back pain.   Skin: Negative for rash and wound.   Neurological: Negative for dizziness, weakness and headaches.   Psychiatric/Behavioral: Negative for agitation and confusion.     Past medical history, surgical history, and family medical history reviewed and updated as appropriate.    Medications and allergies reviewed.     Objective:     Vitals:    03/28/19 1424   BP: (!) 166/80   BP Location: Right arm   Patient Position: Sitting   BP Method: Medium (Manual)   Pulse: 68   Resp: 20   Temp: 97.8 °F (36.6 °C)   TempSrc: Oral   Weight: 53.2 kg (117 lb 4.6 oz)   Height: 5' (1.524 m)     Body mass index is 22.91 kg/m².  Physical Exam   Constitutional: She is oriented to person, place, and time. She appears well-developed and well-nourished. No distress.   HENT:   Head: Normocephalic and atraumatic.   Mouth/Throat: Oropharynx is clear and moist. No oropharyngeal exudate.   Eyes: Conjunctivae are normal. Right eye exhibits no discharge. Left eye exhibits no discharge.   Neck: Normal range of motion. Neck supple.   Cardiovascular: Normal rate, regular rhythm and intact distal pulses. Exam reveals no friction rub.   No murmur heard.  Pulmonary/Chest: Effort normal and breath sounds normal.   Abdominal: Soft. Bowel sounds are normal.  She exhibits no distension. There is no tenderness. There is no guarding.   Musculoskeletal: Normal range of motion. She exhibits no edema.   Lymphadenopathy:     She has no cervical adenopathy.   Neurological: She is alert and oriented to person, place, and time.   Skin: Skin is warm and dry.   Psychiatric: She has a normal mood and affect. Her behavior is normal.   Vitals reviewed.      Lab Results   Component Value Date    WBC 7.3 10/02/2012    HGB 12.4 10/02/2012    HCT 37.5 10/02/2012     10/02/2012    CHOL 238 (H) 10/02/2012    TRIG 124 10/02/2012    HDL 58 10/02/2012    ALT 15 10/02/2012    AST 22 10/02/2012     10/02/2012    K 4.0 10/02/2012     10/02/2012    CREATININE 0.74 10/02/2012    BUN 17 10/02/2012    CO2 28 10/02/2012    TSH 2.23 10/02/2012       Assessment:     1. Encounter for preventive health examination    2. Essential hypertension    3. Anemia, unspecified type    4. Hypothyroidism due to Hashimoto's thyroiditis    5. Selenium deficiency    6. Zinc deficiency    7. Vitamin D deficiency    8. Encounter for screening for cardiovascular disorders    9. Encounter for hepatitis C screening test for low risk patient    10. Screening for HIV (human immunodeficiency virus)    11. Breast cancer screening by mammogram    12. Screening for colon cancer      Plan:   Tressa was seen today for annual exam and establish care.    Diagnoses and all orders for this visit:    Encounter for preventive health examination  -     Comprehensive metabolic panel; Future  -     CBC auto differential; Future  -     Urinalysis; Future    Essential hypertension  - Blood pressure is elevated. Counseled pt on medication compliance. Will start norvasc and rtc in 2 weeks for evaluation  -     amLODIPine (NORVASC) 5 MG tablet; Take 1 tablet (5 mg total) by mouth once daily.    Anemia, unspecified type  -     Iron and TIBC; Future  -     Ferritin; Future  -     Transferrin; Future    Hypothyroidism due to  Hashimoto's thyroiditis  -     TSH; Future  -     T4, free; Future  -     Ambulatory Referral to Endocrinology  -     IODINE, SERUM; Future  -     THYROID PEROXIDASE ANTIBODY; Future  -     THYROGLOBULIN AB SCREEN; Future    Selenium deficiency  -     SELENIUM SERUM; Future    Zinc deficiency  -     Zinc; Future    Vitamin D deficiency  -     Vitamin D; Future    Encounter for screening for cardiovascular disorders  -     Lipid panel; Future    Encounter for hepatitis C screening test for low risk patient  -     Hepatitis C antibody; Future    Screening for HIV (human immunodeficiency virus)  -     HIV 1/2 Ag/Ab (4th Gen); Future    Breast cancer screening by mammogram  -     Mammo Digital Screening Bilat w/ Sergio; Future    Screening for colon cancer  -     Case request GI: COLONOSCOPY    Macular degeneration, unspecified laterality, unspecified type         - Pt follows with her eye docotor         Follow up in about 2 weeks (around 4/11/2019). htn f/u    Steffi Cerda MD  Internal Medicine    3/28/2019

## 2019-03-29 ENCOUNTER — LAB VISIT (OUTPATIENT)
Dept: LAB | Facility: HOSPITAL | Age: 69
End: 2019-03-29
Attending: HOSPITALIST
Payer: MEDICARE

## 2019-03-29 DIAGNOSIS — E59 SELENIUM DEFICIENCY: ICD-10-CM

## 2019-03-29 DIAGNOSIS — E03.8 HYPOTHYROIDISM DUE TO HASHIMOTO'S THYROIDITIS: ICD-10-CM

## 2019-03-29 DIAGNOSIS — E06.3 HYPOTHYROIDISM DUE TO HASHIMOTO'S THYROIDITIS: ICD-10-CM

## 2019-03-29 DIAGNOSIS — E55.9 VITAMIN D DEFICIENCY: ICD-10-CM

## 2019-03-29 DIAGNOSIS — D64.9 ANEMIA, UNSPECIFIED TYPE: ICD-10-CM

## 2019-03-29 DIAGNOSIS — E60 ZINC DEFICIENCY: ICD-10-CM

## 2019-03-29 DIAGNOSIS — Z13.6 ENCOUNTER FOR SCREENING FOR CARDIOVASCULAR DISORDERS: ICD-10-CM

## 2019-03-29 DIAGNOSIS — Z11.4 SCREENING FOR HIV (HUMAN IMMUNODEFICIENCY VIRUS): ICD-10-CM

## 2019-03-29 DIAGNOSIS — Z00.00 ENCOUNTER FOR PREVENTIVE HEALTH EXAMINATION: ICD-10-CM

## 2019-03-29 DIAGNOSIS — Z11.59 ENCOUNTER FOR HEPATITIS C SCREENING TEST FOR LOW RISK PATIENT: ICD-10-CM

## 2019-03-29 LAB
25(OH)D3+25(OH)D2 SERPL-MCNC: 39 NG/ML (ref 30–96)
ALBUMIN SERPL BCP-MCNC: 3.5 G/DL (ref 3.5–5.2)
ALP SERPL-CCNC: 101 U/L (ref 55–135)
ALT SERPL W/O P-5'-P-CCNC: 13 U/L (ref 10–44)
ANION GAP SERPL CALC-SCNC: 7 MMOL/L (ref 8–16)
AST SERPL-CCNC: 19 U/L (ref 10–40)
BASOPHILS # BLD AUTO: 0.1 K/UL (ref 0–0.2)
BASOPHILS NFR BLD: 1.1 % (ref 0–1.9)
BILIRUB SERPL-MCNC: 0.4 MG/DL (ref 0.1–1)
BUN SERPL-MCNC: 11 MG/DL (ref 8–23)
CALCIUM SERPL-MCNC: 9.4 MG/DL (ref 8.7–10.5)
CHLORIDE SERPL-SCNC: 108 MMOL/L (ref 95–110)
CHOLEST SERPL-MCNC: 239 MG/DL (ref 120–199)
CHOLEST/HDLC SERPL: 3.7 {RATIO} (ref 2–5)
CO2 SERPL-SCNC: 27 MMOL/L (ref 23–29)
CREAT SERPL-MCNC: 0.7 MG/DL (ref 0.5–1.4)
DIFFERENTIAL METHOD: ABNORMAL
EOSINOPHIL # BLD AUTO: 1 K/UL (ref 0–0.5)
EOSINOPHIL NFR BLD: 11.7 % (ref 0–8)
ERYTHROCYTE [DISTWIDTH] IN BLOOD BY AUTOMATED COUNT: 14.4 % (ref 11.5–14.5)
EST. GFR  (AFRICAN AMERICAN): >60 ML/MIN/1.73 M^2
EST. GFR  (NON AFRICAN AMERICAN): >60 ML/MIN/1.73 M^2
FERRITIN SERPL-MCNC: 18 NG/ML (ref 20–300)
GLUCOSE SERPL-MCNC: 81 MG/DL (ref 70–110)
HCT VFR BLD AUTO: 38.4 % (ref 37–48.5)
HCV AB SERPL QL IA: NEGATIVE
HDLC SERPL-MCNC: 64 MG/DL (ref 40–75)
HDLC SERPL: 26.8 % (ref 20–50)
HGB BLD-MCNC: 12.5 G/DL (ref 12–16)
HIV 1+2 AB+HIV1 P24 AG SERPL QL IA: NEGATIVE
IMM GRANULOCYTES # BLD AUTO: 0.03 K/UL (ref 0–0.04)
IMM GRANULOCYTES NFR BLD AUTO: 0.3 % (ref 0–0.5)
IRON SERPL-MCNC: 96 UG/DL (ref 30–160)
LDLC SERPL CALC-MCNC: 159.4 MG/DL (ref 63–159)
LYMPHOCYTES # BLD AUTO: 3.2 K/UL (ref 1–4.8)
LYMPHOCYTES NFR BLD: 37.1 % (ref 18–48)
MCH RBC QN AUTO: 30 PG (ref 27–31)
MCHC RBC AUTO-ENTMCNC: 32.6 G/DL (ref 32–36)
MCV RBC AUTO: 92 FL (ref 82–98)
MONOCYTES # BLD AUTO: 0.6 K/UL (ref 0.3–1)
MONOCYTES NFR BLD: 7.1 % (ref 4–15)
NEUTROPHILS # BLD AUTO: 3.7 K/UL (ref 1.8–7.7)
NEUTROPHILS NFR BLD: 42.7 % (ref 38–73)
NONHDLC SERPL-MCNC: 175 MG/DL
NRBC BLD-RTO: 0 /100 WBC
PLATELET # BLD AUTO: 246 K/UL (ref 150–350)
PMV BLD AUTO: 11.1 FL (ref 9.2–12.9)
POTASSIUM SERPL-SCNC: 3.8 MMOL/L (ref 3.5–5.1)
PROT SERPL-MCNC: 6.9 G/DL (ref 6–8.4)
RBC # BLD AUTO: 4.17 M/UL (ref 4–5.4)
SATURATED IRON: 25 % (ref 20–50)
SODIUM SERPL-SCNC: 142 MMOL/L (ref 136–145)
T4 FREE SERPL-MCNC: 1.24 NG/DL (ref 0.71–1.51)
THYROGLOB AB SERPL IA-ACNC: 13.8 IU/ML (ref 0–3.9)
THYROPEROXIDASE IGG SERPL-ACNC: 13.4 IU/ML
TOTAL IRON BINDING CAPACITY: 386 UG/DL (ref 250–450)
TRANSFERRIN SERPL-MCNC: 261 MG/DL (ref 200–375)
TRANSFERRIN SERPL-MCNC: 261 MG/DL (ref 200–375)
TRIGL SERPL-MCNC: 78 MG/DL (ref 30–150)
TSH SERPL DL<=0.005 MIU/L-ACNC: 0.12 UIU/ML (ref 0.4–4)
WBC # BLD AUTO: 8.74 K/UL (ref 3.9–12.7)

## 2019-03-29 PROCEDURE — 84630 ASSAY OF ZINC: CPT | Mod: HCNC

## 2019-03-29 PROCEDURE — 86703 HIV-1/HIV-2 1 RESULT ANTBDY: CPT | Mod: HCNC

## 2019-03-29 PROCEDURE — 80061 LIPID PANEL: CPT | Mod: HCNC

## 2019-03-29 PROCEDURE — 84255 ASSAY OF SELENIUM: CPT | Mod: HCNC

## 2019-03-29 PROCEDURE — 80053 COMPREHEN METABOLIC PANEL: CPT | Mod: HCNC

## 2019-03-29 PROCEDURE — 84443 ASSAY THYROID STIM HORMONE: CPT | Mod: HCNC

## 2019-03-29 PROCEDURE — 82728 ASSAY OF FERRITIN: CPT | Mod: HCNC

## 2019-03-29 PROCEDURE — 86376 MICROSOMAL ANTIBODY EACH: CPT | Mod: HCNC

## 2019-03-29 PROCEDURE — 86803 HEPATITIS C AB TEST: CPT | Mod: HCNC

## 2019-03-29 PROCEDURE — 85025 COMPLETE CBC W/AUTO DIFF WBC: CPT | Mod: HCNC

## 2019-03-29 PROCEDURE — 86800 THYROGLOBULIN ANTIBODY: CPT | Mod: HCNC

## 2019-03-29 PROCEDURE — 83018 HEAVY METAL QUAN EACH NES: CPT | Mod: HCNC

## 2019-03-29 PROCEDURE — 83540 ASSAY OF IRON: CPT | Mod: HCNC

## 2019-03-29 PROCEDURE — 82306 VITAMIN D 25 HYDROXY: CPT | Mod: HCNC

## 2019-03-29 PROCEDURE — 84439 ASSAY OF FREE THYROXINE: CPT | Mod: HCNC

## 2019-04-01 LAB
IODINE SERPL-MCNC: 112 NG/ML (ref 40–92)
ZINC SERPL-MCNC: 68 UG/DL (ref 60–130)

## 2019-04-03 ENCOUNTER — TELEPHONE (OUTPATIENT)
Dept: INTERNAL MEDICINE | Facility: CLINIC | Age: 69
End: 2019-04-03

## 2019-04-03 DIAGNOSIS — E03.9 HYPOTHYROIDISM, UNSPECIFIED TYPE: Primary | ICD-10-CM

## 2019-04-03 RX ORDER — LEVOTHYROXINE SODIUM 75 UG/1
75 TABLET ORAL
Qty: 30 TABLET | Refills: 3 | Status: SHIPPED | OUTPATIENT
Start: 2019-04-03 | End: 2019-04-03

## 2019-04-03 RX ORDER — LEVOTHYROXINE SODIUM 75 UG/1
75 TABLET ORAL
Qty: 30 TABLET | Refills: 3 | Status: SHIPPED | OUTPATIENT
Start: 2019-04-03 | End: 2020-04-02

## 2019-04-03 NOTE — TELEPHONE ENCOUNTER
Spoke w/ pt over the phone about annual labs. Most labs wnl/acceptable range. Except thyroid abnormal. Will decrease synthroid dose and repeat levels in 3 months. Pt will notify MD if would like to have endocrine f/u.

## 2019-04-05 LAB — SELENIUM SERPL-MCNC: 224 UG/L (ref 23–190)

## 2019-04-08 ENCOUNTER — TELEPHONE (OUTPATIENT)
Dept: INTERNAL MEDICINE | Facility: CLINIC | Age: 69
End: 2019-04-08

## 2019-04-08 NOTE — TELEPHONE ENCOUNTER
----- Message from Steffi Cerda MD sent at 4/5/2019  9:26 AM CDT -----  Please notify pt that her selenium level returned mildly elevated at 224

## 2019-06-07 ENCOUNTER — TELEPHONE (OUTPATIENT)
Dept: INTERNAL MEDICINE | Facility: CLINIC | Age: 69
End: 2019-06-07

## 2019-06-07 DIAGNOSIS — G47.00 INSOMNIA, UNSPECIFIED TYPE: Primary | ICD-10-CM

## 2019-06-07 NOTE — TELEPHONE ENCOUNTER
----- Message from Joshua Jean sent at 6/7/2019  2:12 PM CDT -----  Contact: self   Patient would like to get medical advice.  Symptoms (please be specific):  Cannot sleep   How long has patient had these symptoms:  For a while patient stated  Pharmacy name and phone # (copy/paste from chart):    Any drug allergies (copy/paste from chart):    [phone] (Fax)   [facility] - [place] [phone] (Phone)  [phone] (Fax)  Would the patient rather a call back or a response via [facility]?:  Call back  Comments:

## 2019-08-16 ENCOUNTER — TELEPHONE (OUTPATIENT)
Dept: INTERNAL MEDICINE | Facility: CLINIC | Age: 69
End: 2019-08-16

## 2019-08-16 NOTE — TELEPHONE ENCOUNTER
Informed patient record release is not done from the office, gave patient the number to record release.     [phone]

## 2019-08-16 NOTE — TELEPHONE ENCOUNTER
----- Message from Daniela Espinoza sent at 8/16/2019  2:23 PM CDT -----  Contact: [phone]  Patient is checking the status of a copy of her April lab results that was suppose to sent to   Dr. Minor.    Patient stated she requested this back in April, but the doctor never received them.    Please advise, thank you.

## 2019-09-12 ENCOUNTER — TELEPHONE (OUTPATIENT)
Dept: INTERNAL MEDICINE | Facility: CLINIC | Age: 69
End: 2019-09-12

## 2019-09-12 DIAGNOSIS — Z77.120 SUSPECTED EXPOSURE TO MOLD: Primary | ICD-10-CM

## 2019-09-26 ENCOUNTER — OFFICE VISIT (OUTPATIENT)
Dept: ALLERGY | Facility: CLINIC | Age: 69
End: 2019-09-26
Payer: MEDICARE

## 2019-09-26 ENCOUNTER — LAB VISIT (OUTPATIENT)
Dept: LAB | Facility: HOSPITAL | Age: 69
End: 2019-09-26
Attending: ALLERGY & IMMUNOLOGY
Payer: MEDICARE

## 2019-09-26 VITALS — HEIGHT: 60 IN | RESPIRATION RATE: 16 BRPM | BODY MASS INDEX: 23.63 KG/M2 | WEIGHT: 120.38 LBS

## 2019-09-26 DIAGNOSIS — G47.9 SLEEP DISORDER: ICD-10-CM

## 2019-09-26 DIAGNOSIS — R53.83 FATIGUE, UNSPECIFIED TYPE: ICD-10-CM

## 2019-09-26 DIAGNOSIS — J31.0 CHRONIC RHINITIS: Primary | ICD-10-CM

## 2019-09-26 DIAGNOSIS — J31.0 CHRONIC RHINITIS: ICD-10-CM

## 2019-09-26 PROCEDURE — 99999 PR PBB SHADOW E&M-EST. PATIENT-LVL III: CPT | Mod: PBBFAC,HCNC,, | Performed by: ALLERGY & IMMUNOLOGY

## 2019-09-26 PROCEDURE — 36415 COLL VENOUS BLD VENIPUNCTURE: CPT | Mod: HCNC,PO

## 2019-09-26 PROCEDURE — 86003 ALLG SPEC IGE CRUDE XTRC EA: CPT | Mod: 59,HCNC

## 2019-09-26 PROCEDURE — 99204 PR OFFICE/OUTPT VISIT, NEW, LEVL IV, 45-59 MIN: ICD-10-PCS | Mod: HCNC,S$GLB,, | Performed by: ALLERGY & IMMUNOLOGY

## 2019-09-26 PROCEDURE — 99999 PR PBB SHADOW E&M-EST. PATIENT-LVL III: ICD-10-PCS | Mod: PBBFAC,HCNC,, | Performed by: ALLERGY & IMMUNOLOGY

## 2019-09-26 PROCEDURE — 99204 OFFICE O/P NEW MOD 45 MIN: CPT | Mod: HCNC,S$GLB,, | Performed by: ALLERGY & IMMUNOLOGY

## 2019-09-26 PROCEDURE — 86003 ALLG SPEC IGE CRUDE XTRC EA: CPT | Mod: HCNC

## 2019-09-26 RX ORDER — LEVOTHYROXINE SODIUM 75 UG/1
1 CAPSULE ORAL DAILY
COMMUNITY
End: 2020-05-26

## 2019-09-26 RX ORDER — LISINOPRIL 5 MG/1
TABLET ORAL
COMMUNITY
Start: 2019-09-10 | End: 2020-05-26

## 2019-09-26 RX ORDER — HYDROCHLOROTHIAZIDE 25 MG/1
TABLET ORAL
COMMUNITY
Start: 2019-09-10 | End: 2020-05-26

## 2019-09-26 NOTE — LETTER
September 26, 2019      Steffi Cerda MD  [address] [facility]  [place] [address]           [place] [facility]  [address] [facility]  [place] [address]  Phone: [phone]          Patient: Tressa Quiroz   MR Number: [id number]   YOB: 1950   Date of Visit: 9/26/2019       Dear Dr. Steffi Cerda:    Thank you for referring Tressa Quiroz to me for evaluation. Attached you will find relevant portions of my assessment and plan of care.    If you have questions, please do not hesitate to call me. I look forward to following Tressa Quiroz along with you.    Sincerely,    Aimee Layton MD    Enclosure  CC:  No Recipients    If you would like to receive this communication electronically, please contact [email or web address] or [phone] to request more information on [employer] Link access.    For providers and/or their staff who would like to refer a patient to [facility], please contact us through our one-stop-shop provider referral line, [facility] , at [phone].    If you feel you have received this communication in error or would no longer like to receive these types of communications, please e-mail [email or web address]

## 2019-09-26 NOTE — PROGRESS NOTES
Subjective:       Patient ID: Tressa Quiroz is a 69 y.o. female.    Chief Complaint:  Allergies      70 yo woman presents for new patient evaluation of possible allergies. She sates she has trouble sleeping well. She has fatigue and always feels foggy. She has autoimmune issues with Hashimoto and Sjogren's. She has read that allergies to mold or gluten can be cause. She does have nasal congestion. And about twice a year gets runny nose. No sneeze, no itch. No chest symptoms. No rashes. No abdominal pain but has some bloating. She did have a fall as a child and right side of nose is more congested then left. She has no asthma or eczema. No know insect or latex allergy. No H/o sinus surgery.       Environmental History: see history section for home environment  Review of Systems   Constitutional: Positive for fatigue. Negative for appetite change, chills and fever.   HENT: Positive for congestion and rhinorrhea. Negative for ear discharge, ear pain, facial swelling, nosebleeds, postnasal drip, sinus pressure, sneezing, sore throat, trouble swallowing and voice change.    Eyes: Negative for discharge, redness, itching and visual disturbance.   Respiratory: Negative for cough, choking, chest tightness, shortness of breath and wheezing.    Cardiovascular: Negative for chest pain, palpitations and leg swelling.   Gastrointestinal: Positive for abdominal distention. Negative for abdominal pain, constipation, diarrhea, nausea and vomiting.   Genitourinary: Negative for difficulty urinating.   Musculoskeletal: Negative for arthralgias, gait problem, joint swelling and myalgias.   Skin: Negative for color change and rash.   Neurological: Negative for dizziness, syncope, weakness, light-headedness and headaches.   Hematological: Negative for adenopathy. Does not bruise/bleed easily.   Psychiatric/Behavioral: Positive for decreased concentration and sleep disturbance. Negative for agitation, behavioral problems and  confusion. The patient is not nervous/anxious.         Objective:      Physical Exam   Constitutional: She is oriented to person, place, and time. She appears well-developed and well-nourished. No distress.   HENT:   Head: Normocephalic and atraumatic.   Right Ear: Hearing, tympanic membrane, external ear and ear canal normal.   Left Ear: Hearing, tympanic membrane, external ear and ear canal normal.   Nose: No mucosal edema, rhinorrhea, sinus tenderness or septal deviation. No epistaxis. Right sinus exhibits no maxillary sinus tenderness and no frontal sinus tenderness. Left sinus exhibits no maxillary sinus tenderness and no frontal sinus tenderness.   Mouth/Throat: Uvula is midline, oropharynx is clear and moist and mucous membranes are normal. No uvula swelling.   Eyes: Conjunctivae are normal. Right eye exhibits no discharge. Left eye exhibits no discharge.   Neck: Normal range of motion. No thyromegaly present.   Cardiovascular: Normal rate, regular rhythm and normal heart sounds.   No murmur heard.  Pulmonary/Chest: Effort normal and breath sounds normal. No respiratory distress. She has no wheezes.   Abdominal: Soft. She exhibits no distension. There is no tenderness.   Musculoskeletal: Normal range of motion. She exhibits no edema or tenderness.   Lymphadenopathy:     She has no cervical adenopathy.   Neurological: She is alert and oriented to person, place, and time.   Skin: Skin is warm and dry. No rash noted. No erythema.   Psychiatric: She has a normal mood and affect. Her behavior is normal. Judgment and thought content normal.   Nursing note and vitals reviewed.      Laboratory:   none performed   Assessment:       1. Chronic rhinitis    2. Sleep disorder    3. Fatigue, unspecified type         Plan:       1. advised pt that IgE medicated allergy is not cause of autoimmune disorders. Advised her fatigue and sleep issues may be more related to her autoimmune but given her rhinitis will eval further  with immunocaps  2. Phone review

## 2019-09-30 LAB
A ALTERNATA IGE QN: <0.35 KU/L
A FUMIGATUS IGE QN: <0.35 KU/L
ALLERGEN CHAETOMIUM GLOBOSUM IGE: <0.35 KU/L
ALLERGEN WALNUT TREE IGE: <0.35 KU/L
ALLERGEN WHITE PINE TREE IGE: <0.35 KU/L
B CINEREA IGE QN: <0.35 KU/L
BAHIA GRASS IGE QN: <0.35 KU/L
BALD CYPRESS IGE QN: <0.35 KU/L
BERMUDA GRASS IGE QN: <0.35 KU/L
C HERBARUM IGE QN: <0.35 KU/L
C LUNATA IGE QN: <0.35 KU/L
CAT DANDER IGE QN: <0.35 KU/L
CHAETOMIUM GLOB. CLASS: NORMAL
COMMON RAGWEED IGE QN: <0.35 KU/L
COTTONWOOD IGE QN: <0.35 KU/L
D FARINAE IGE QN: <0.35 KU/L
D PTERONYSS IGE QN: <0.35 KU/L
DEPRECATED A ALTERNATA IGE RAST QL: NORMAL
DEPRECATED A FUMIGATUS IGE RAST QL: NORMAL
DEPRECATED B CINEREA IGE RAST QL: NORMAL
DEPRECATED BAHIA GRASS IGE RAST QL: NORMAL
DEPRECATED BALD CYPRESS IGE RAST QL: NORMAL
DEPRECATED BERMUDA GRASS IGE RAST QL: NORMAL
DEPRECATED C HERBARUM IGE RAST QL: NORMAL
DEPRECATED C LUNATA IGE RAST QL: NORMAL
DEPRECATED CAT DANDER IGE RAST QL: NORMAL
DEPRECATED COMMON RAGWEED IGE RAST QL: NORMAL
DEPRECATED COTTONWOOD IGE RAST QL: NORMAL
DEPRECATED D FARINAE IGE RAST QL: NORMAL
DEPRECATED D PTERONYSS IGE RAST QL: NORMAL
DEPRECATED DOG DANDER IGE RAST QL: NORMAL
DEPRECATED ELDER IGE RAST QL: NORMAL
DEPRECATED ENGL PLANTAIN IGE RAST QL: NORMAL
DEPRECATED GLUTEN IGE RAST QL: NORMAL
DEPRECATED JOHNSON GRASS IGE RAST QL: NORMAL
DEPRECATED LONDON PLANE IGE RAST QL: NORMAL
DEPRECATED MUGWORT IGE RAST QL: NORMAL
DEPRECATED P BETAE IGE RAST QL: NORMAL
DEPRECATED P NOTATUM IGE RAST QL: NORMAL
DEPRECATED PECAN/HICK TREE IGE RAST QL: NORMAL
DEPRECATED ROACH IGE RAST QL: NORMAL
DEPRECATED S ROSTRATA IGE RAST QL: NORMAL
DEPRECATED SALTWORT IGE RAST QL: NORMAL
DEPRECATED SILVER BIRCH IGE RAST QL: NORMAL
DEPRECATED TIMOTHY IGE RAST QL: NORMAL
DEPRECATED WHEAT IGE RAST QL: NORMAL
DEPRECATED WHITE OAK IGE RAST QL: NORMAL
DEPRECATED WILLOW IGE RAST QL: NORMAL
DOG DANDER IGE QN: <0.35 KU/L
ELDER IGE QN: <0.35 KU/L
ENGL PLANTAIN IGE QN: <0.35 KU/L
GLUTEN IGE QN: <0.35 KU/L
JOHNSON GRASS IGE QN: <0.35 KU/L
LONDON PLANE IGE QN: <0.35 KU/L
MUGWORT IGE QN: <0.35 KU/L
P BETAE IGE QN: <0.35 KU/L
P NOTATUM IGE QN: <0.35 KU/L
PECAN/HICK TREE IGE QN: <0.35 KU/L
ROACH IGE QN: <0.35 KU/L
S ROSTRATA IGE QN: <0.35 KU/L
SALTWORT IGE QN: <0.35 KU/L
SILVER BIRCH IGE QN: <0.35 KU/L
TIMOTHY IGE QN: <0.35 KU/L
WALNUT TREE CLASS: NORMAL
WHEAT IGE QN: <0.35 KU/L
WHITE OAK IGE QN: <0.35 KU/L
WHITE PINE CLASS: NORMAL
WILLOW IGE QN: <0.35 KU/L

## 2019-10-02 ENCOUNTER — TELEPHONE (OUTPATIENT)
Dept: ALLERGY | Facility: CLINIC | Age: 69
End: 2019-10-02

## 2019-10-02 NOTE — TELEPHONE ENCOUNTER
----- Message from Yaquelin Pham MA sent at 10/2/2019  2:54 PM CDT -----  Contact: Pt [phone]  Please advise.    Thanks   Yaquelin    ----- Message -----  From: Viviana Lancaster  Sent: 10/2/2019   2:51 PM CDT  To: [facility] Staff    Patient would like to get test results  Name of test (lab, mammo, etc.):   Lab  Date of test:  9/26/19    Thank you

## 2019-10-02 NOTE — TELEPHONE ENCOUNTER
Spoke to pt, told her Dr Layton said all tests were negative and explained to her how to set up mychart.

## 2019-10-14 ENCOUNTER — TELEPHONE (OUTPATIENT)
Dept: INTERNAL MEDICINE | Facility: CLINIC | Age: 69
End: 2019-10-14

## 2019-10-14 NOTE — TELEPHONE ENCOUNTER
MD spoke w/ pt. Pt found 2 ticks on her body. One was on right arm and her right chest.   States on for about 8 hours and small size.   No rash, fevers or joint pain     At this time does not appear to be any indication for prophylaxis at this time. Pt advised to do thorough skin check. Notify MD if developed fevers or rash etc

## 2019-10-14 NOTE — TELEPHONE ENCOUNTER
----- Message from Felicitas Metcalf sent at 10/14/2019 11:53 AM CDT -----  Contact: [phone]  Patient is requesting a call from the office. She stated she saw two ticks on her skin and they bit her. She would like to speak to someone.  Please advise, thanks

## 2019-12-27 ENCOUNTER — TELEPHONE (OUTPATIENT)
Dept: INTERNAL MEDICINE | Facility: CLINIC | Age: 69
End: 2019-12-27

## 2019-12-27 DIAGNOSIS — E55.9 VITAMIN D DEFICIENCY: ICD-10-CM

## 2019-12-27 DIAGNOSIS — E03.9 HYPOTHYROIDISM, UNSPECIFIED TYPE: Primary | ICD-10-CM

## 2019-12-27 DIAGNOSIS — G47.00 INSOMNIA, UNSPECIFIED TYPE: ICD-10-CM

## 2019-12-27 DIAGNOSIS — I10 ESSENTIAL HYPERTENSION: ICD-10-CM

## 2019-12-27 NOTE — TELEPHONE ENCOUNTER
Please order labs.  Date of doctor appointment:  03/23/20   Date of lab appointment:  03/30/20   Physical or EP:

## 2019-12-27 NOTE — TELEPHONE ENCOUNTER
----- Message from Aye Oswald sent at 12/27/2019  3:10 PM CST -----  Doctor appointment and lab have been scheduled.  Please link lab orders to the lab appointment.  Date of doctor appointment:  03/23/20  Date of lab appointment:  03/30/20  Physical or EP:   Comments:     REMINDER to appt coordinator: ## delete this from the message after reading! ##     1) Physical Established Patient: NO LABS FIRST for Raheem, Mei, Hipolito, Aishwarya, Naman and Burciaga  2) The lab appointment must be at least 3 days from now to allow time for the order to be entered and linked to the appointment.   3) Lab appointment should be scheduled at least 3 days before the doctor appointment.

## 2019-12-31 ENCOUNTER — TELEPHONE (OUTPATIENT)
Dept: INTERNAL MEDICINE | Facility: CLINIC | Age: 69
End: 2019-12-31

## 2019-12-31 NOTE — TELEPHONE ENCOUNTER
----- Message from Leonela Blancas sent at 12/31/2019  9:40 AM CST -----  Contact: self/ [phone]  Caller is requesting an earlier appt than we can schedule.  Caller declined first available appointment listed below. Caller will not accept being placed on the wait list and is requesting a message be sent to the provider.  When is the next available appointment:  1/10/20  Did you offer to schedule the next available appt and put the patient on the wait list?:     What visit type: ep/same  Symptoms:  Feeling weak   Patient preference of timeframe to be scheduled:    What is the reason the patient is requesting a sooner appointment? (insurance terminating, changing jobs):    Would the patient rather a call back or a response via [facility]?:    Comments:

## 2020-01-03 ENCOUNTER — OFFICE VISIT (OUTPATIENT)
Dept: INTERNAL MEDICINE | Facility: CLINIC | Age: 70
End: 2020-01-03
Payer: MEDICARE

## 2020-01-03 ENCOUNTER — LAB VISIT (OUTPATIENT)
Dept: LAB | Facility: HOSPITAL | Age: 70
End: 2020-01-03
Attending: HOSPITALIST
Payer: MEDICARE

## 2020-01-03 VITALS
HEART RATE: 60 BPM | HEIGHT: 60 IN | BODY MASS INDEX: 23.89 KG/M2 | SYSTOLIC BLOOD PRESSURE: 190 MMHG | TEMPERATURE: 98 F | RESPIRATION RATE: 15 BRPM | WEIGHT: 121.69 LBS | DIASTOLIC BLOOD PRESSURE: 80 MMHG

## 2020-01-03 DIAGNOSIS — R53.1 WEAKNESS: ICD-10-CM

## 2020-01-03 DIAGNOSIS — I10 HYPERTENSION, UNSPECIFIED TYPE: ICD-10-CM

## 2020-01-03 DIAGNOSIS — R53.1 WEAKNESS: Primary | ICD-10-CM

## 2020-01-03 LAB
ALBUMIN SERPL BCP-MCNC: 3.5 G/DL (ref 3.5–5.2)
ALP SERPL-CCNC: 94 U/L (ref 55–135)
ALT SERPL W/O P-5'-P-CCNC: 14 U/L (ref 10–44)
ANION GAP SERPL CALC-SCNC: 7 MMOL/L (ref 8–16)
AST SERPL-CCNC: 20 U/L (ref 10–40)
BASOPHILS # BLD AUTO: 0.08 K/UL (ref 0–0.2)
BASOPHILS NFR BLD: 1.2 % (ref 0–1.9)
BILIRUB SERPL-MCNC: 0.4 MG/DL (ref 0.1–1)
BUN SERPL-MCNC: 11 MG/DL (ref 8–23)
CALCIUM SERPL-MCNC: 9.3 MG/DL (ref 8.7–10.5)
CHLORIDE SERPL-SCNC: 106 MMOL/L (ref 95–110)
CO2 SERPL-SCNC: 29 MMOL/L (ref 23–29)
CREAT SERPL-MCNC: 0.7 MG/DL (ref 0.5–1.4)
DIFFERENTIAL METHOD: ABNORMAL
EOSINOPHIL # BLD AUTO: 0.3 K/UL (ref 0–0.5)
EOSINOPHIL NFR BLD: 4.7 % (ref 0–8)
ERYTHROCYTE [DISTWIDTH] IN BLOOD BY AUTOMATED COUNT: 14.3 % (ref 11.5–14.5)
EST. GFR  (AFRICAN AMERICAN): >60 ML/MIN/1.73 M^2
EST. GFR  (NON AFRICAN AMERICAN): >60 ML/MIN/1.73 M^2
GLUCOSE SERPL-MCNC: 77 MG/DL (ref 70–110)
HCT VFR BLD AUTO: 39.5 % (ref 37–48.5)
HGB BLD-MCNC: 12.3 G/DL (ref 12–16)
IMM GRANULOCYTES # BLD AUTO: 0.02 K/UL (ref 0–0.04)
IMM GRANULOCYTES NFR BLD AUTO: 0.3 % (ref 0–0.5)
LYMPHOCYTES # BLD AUTO: 2.7 K/UL (ref 1–4.8)
LYMPHOCYTES NFR BLD: 40.5 % (ref 18–48)
MCH RBC QN AUTO: 28.9 PG (ref 27–31)
MCHC RBC AUTO-ENTMCNC: 31.1 G/DL (ref 32–36)
MCV RBC AUTO: 93 FL (ref 82–98)
MONOCYTES # BLD AUTO: 0.5 K/UL (ref 0.3–1)
MONOCYTES NFR BLD: 7.6 % (ref 4–15)
NEUTROPHILS # BLD AUTO: 3 K/UL (ref 1.8–7.7)
NEUTROPHILS NFR BLD: 45.7 % (ref 38–73)
NRBC BLD-RTO: 0 /100 WBC
PLATELET # BLD AUTO: 233 K/UL (ref 150–350)
PMV BLD AUTO: 11 FL (ref 9.2–12.9)
POTASSIUM SERPL-SCNC: 4.3 MMOL/L (ref 3.5–5.1)
PROT SERPL-MCNC: 7 G/DL (ref 6–8.4)
RBC # BLD AUTO: 4.26 M/UL (ref 4–5.4)
SODIUM SERPL-SCNC: 142 MMOL/L (ref 136–145)
WBC # BLD AUTO: 6.55 K/UL (ref 3.9–12.7)

## 2020-01-03 PROCEDURE — 36415 COLL VENOUS BLD VENIPUNCTURE: CPT | Mod: HCNC,PO

## 2020-01-03 PROCEDURE — 99999 PR PBB SHADOW E&M-EST. PATIENT-LVL III: ICD-10-PCS | Mod: PBBFAC,HCNC,, | Performed by: NURSE PRACTITIONER

## 2020-01-03 PROCEDURE — 99999 PR PBB SHADOW E&M-EST. PATIENT-LVL III: CPT | Mod: PBBFAC,HCNC,, | Performed by: NURSE PRACTITIONER

## 2020-01-03 PROCEDURE — 85025 COMPLETE CBC W/AUTO DIFF WBC: CPT | Mod: HCNC

## 2020-01-03 PROCEDURE — 99213 OFFICE O/P EST LOW 20 MIN: CPT | Mod: HCNC,S$GLB,, | Performed by: NURSE PRACTITIONER

## 2020-01-03 PROCEDURE — 80053 COMPREHEN METABOLIC PANEL: CPT | Mod: HCNC

## 2020-01-03 PROCEDURE — 99213 PR OFFICE/OUTPT VISIT, EST, LEVL III, 20-29 MIN: ICD-10-PCS | Mod: HCNC,S$GLB,, | Performed by: NURSE PRACTITIONER

## 2020-01-03 RX ORDER — AMLODIPINE BESYLATE 5 MG/1
5 TABLET ORAL DAILY
Qty: 30 TABLET | Refills: 11 | Status: SHIPPED | OUTPATIENT
Start: 2020-01-03 | End: 2020-10-21

## 2020-01-03 NOTE — PROGRESS NOTES
[facility] Note    Chief Complaint      Chief Complaint   Patient presents with    Fatigue     History of Present Illness      Tressa Quiroz is a 69 y.o. female patient of Dr. Aguirre who is new to me and presents today for c/o fatigue. bp elevated today in office. Has not been taking her BP meds, she has been trying to decrease her BP naturally. Denies HA, dizziness, sob or cp.     Problem List Items Addressed This Visit     None      Visit Diagnoses     Weakness    -  Primary    Relevant Medications    amLODIPine (NORVASC) 5 MG tablet    Other Relevant Orders    CBC auto differential (Completed)    Comprehensive metabolic panel (Completed)    Hypertension, unspecified type        Relevant Medications    amLODIPine (NORVASC) 5 MG tablet    Other Relevant Orders    CBC auto differential (Completed)    Comprehensive metabolic panel (Completed)          Health Maintenance   Topic Date Due    TETANUS VACCINE  09/22/1967    Mammogram  09/22/1989    DEXA SCAN  09/22/1989    Pneumococcal Vaccine (65+ High/Highest Risk) (1 of 2 - PCV13) 09/22/2014    Colonoscopy  01/01/2019    Lipid Panel  03/29/2020    Hepatitis C Screening  Completed       Past Medical History:   Diagnosis Date    Allergy     AR (allergic rhinitis) 10/1/2012    Basal cell carcinoma of nasal tip 10/1/2012    Hair loss disorder 10/1/2012    Hypertension     Hypothyroidism 10/1/2012    Thyroid disease        Past Surgical History:   Procedure Laterality Date    BUNIONECTOMY         family history includes Cancer in her brother; Hyperlipidemia in her father; Stroke in her maternal aunt.    Social History     Tobacco Use    Smoking status: Never Smoker    Tobacco comment: 1 daughter, [employer]ks [employer]ights.   Substance Use Topics    Alcohol use: No    Drug use: No       Review of Systems   Constitutional: Negative for chills and fever.   HENT: Negative for congestion, sinus pain and sore throat.    Eyes: Negative for  blurred vision.   Respiratory: Negative for cough, shortness of breath and wheezing.    Cardiovascular: Negative for chest pain, palpitations and leg swelling.   Gastrointestinal: Negative for abdominal pain, constipation, diarrhea, nausea and vomiting.   Genitourinary: Negative for dysuria.   Musculoskeletal: Negative for myalgias.   Skin: Negative for rash.   Neurological: Positive for weakness. Negative for dizziness and headaches.   Psychiatric/Behavioral: Negative for depression. The patient is not nervous/anxious.         Outpatient Encounter Medications as of 1/3/2020   Medication Sig Dispense Refill    levothyroxine (TIROSINT) 75 mcg Cap Take 1 tablet by mouth once daily.      naltrexone capsule Take 4.5 mg by mouth every evening.   99    amLODIPine (NORVASC) 5 MG tablet Take 1 tablet (5 mg total) by mouth once daily. 30 tablet 11    hydroCHLOROthiazide (HYDRODIURIL) 25 MG tablet       levothyroxine (SYNTHROID) 75 MCG tablet Take 1 tablet (75 mcg total) by mouth before breakfast. (Patient not taking: Reported on 1/3/2020) 30 tablet 3    lisinopril (PRINIVIL,ZESTRIL) 5 MG tablet       [DISCONTINUED] amLODIPine (NORVASC) 5 MG tablet Take 1 tablet (5 mg total) by mouth once daily. (Patient not taking: Reported on 1/3/2020) 30 tablet 3     No facility-administered encounter medications on file as of 1/3/2020.         Review of patient's allergies indicates:   Allergen Reactions    Vitamin d analogue      Joint pain. Only with vitamin D2        Physical Exam      Vital Signs  Temp: 98.1 °F (36.7 °C)  Temp src: Oral  Pulse: 60  Resp: 15  BP: (!) 190/80  BP Location: Left arm  Patient Position: Sitting  Pain Score: 0-No pain  Height and Weight  Height: 5' (152.4 cm)  Weight: 55.2 kg (121 lb 11.1 oz)  BSA (Calculated - sq m): 1.53 sq meters  BMI (Calculated): 23.8  Weight in (lb) to have BMI = 25: 127.7]    Physical Exam   Constitutional: She is oriented to person, place, and time. She appears well-developed  and well-nourished.   HENT:   Head: Normocephalic and atraumatic.   Right Ear: External ear normal.   Left Ear: External ear normal.   Eyes: Pupils are equal, round, and reactive to light. Conjunctivae and EOM are normal.   Neck: Normal range of motion. Neck supple.   Cardiovascular: Normal rate, regular rhythm and normal heart sounds.   No murmur heard.  Pulmonary/Chest: Effort normal and breath sounds normal. She exhibits no tenderness.   Abdominal: Soft.   Musculoskeletal: Normal range of motion.   Lymphadenopathy:     She has no cervical adenopathy.   Neurological: She is alert and oriented to person, place, and time.   Skin: Skin is warm and dry.   Psychiatric: She has a normal mood and affect. Her behavior is normal. Judgment and thought content normal.   Nursing note reviewed.       Laboratory:  CBC:  Recent Labs   Lab Result Units 01/03/20  1135   WBC K/uL 6.55   RBC M/uL 4.26   Hemoglobin g/dL 12.3   Hematocrit % 39.5   Platelets K/uL 233   Mean Corpuscular Volume fL 93   Mean Corpuscular Hemoglobin pg 28.9   Mean Corpuscular Hemoglobin Conc g/dL 31.1*     CMP:  Recent Labs   Lab Result Units 01/03/20  1135   Glucose mg/dL 77   Calcium mg/dL 9.3   Albumin g/dL 3.5   Total Protein g/dL 7.0   Sodium mmol/L 142   Potassium mmol/L 4.3   CO2 mmol/L 29   Chloride mmol/L 106   BUN, Bld mg/dL 11   Alkaline Phosphatase U/L 94   ALT U/L 14   AST U/L 20   Total Bilirubin mg/dL 0.4     URINALYSIS:  No results for input(s): COLORU, CLARITYU, SPECGRAV, PHUR, PROTEINUA, GLUCOSEU, BILIRUBINCON, BLOODU, WBCU, RBCU, BACTERIA, MUCUS, NITRITE, LEUKOCYTESUR, UROBILINOGEN, HYALINECASTS in the last 2160 hours.   LIPIDS:  No results for input(s): TSH, HDL, CHOL, TRIG, LDLCALC, CHOLHDL, NONHDLCHOL, TOTALCHOLEST in the last 2160 hours.  TSH:  No results for input(s): TSH in the last 2160 hours.  A1C:  No results for input(s): HGBA1C in the last 2160 hours.      Assessment/Plan     Tressa Quiroz is a 69 y.o.female with:    1.  Hypertension, unspecified type  - CBC auto differential; Future  - Comprehensive metabolic panel; Future  - amLODIPine (NORVASC) 5 MG tablet; Take 1 tablet (5 mg total) by mouth once daily.  Dispense: 30 tablet; Refill: 11    2. Weakness  - CBC auto differential; Future  - Comprehensive metabolic panel; Future  - amLODIPine (NORVASC) 5 MG tablet; Take 1 tablet (5 mg total) by mouth once daily.  Dispense: 30 tablet; Refill: 11      -Continue current medications and maintain follow up with specialists.  Return to clinic in 2 weeks       Erin Jiminez, NP-C [facility]

## 2020-01-06 ENCOUNTER — TELEPHONE (OUTPATIENT)
Dept: INTERNAL MEDICINE | Facility: CLINIC | Age: 70
End: 2020-01-06

## 2020-02-20 ENCOUNTER — PATIENT OUTREACH (OUTPATIENT)
Dept: ADMINISTRATIVE | Facility: OTHER | Age: 70
End: 2020-02-20

## 2020-02-20 DIAGNOSIS — Z12.11 ENCOUNTER FOR FIT (FECAL IMMUNOCHEMICAL TEST) SCREENING: Primary | ICD-10-CM

## 2020-03-16 ENCOUNTER — TELEPHONE (OUTPATIENT)
Dept: INTERNAL MEDICINE | Facility: CLINIC | Age: 70
End: 2020-03-16

## 2020-03-16 NOTE — TELEPHONE ENCOUNTER
The patient called on both home and mobile phones. Message left regarding pre-op clearance needed.

## 2020-03-16 NOTE — TELEPHONE ENCOUNTER
----- Message from Kenna Banerjee sent at 3/16/2020 11:21 AM CDT -----  Type:  Needs Medical Advice    Who Called:  Mary bourne/ Pa Jacinto    Symptoms (please be specific): surgical clearance surgery for surgery on 3/26    Would the patient rather a call back or a response via [facility]? Call    Best Call Back Number: [phone] ext [phone]  FAX: [phone]    Additional Information:  Mary called to request a surgical clearance be faxed to the number above. She is requesting a call back.

## 2020-04-28 ENCOUNTER — TELEPHONE (OUTPATIENT)
Dept: INTERNAL MEDICINE | Facility: CLINIC | Age: 70
End: 2020-04-28

## 2020-04-28 NOTE — TELEPHONE ENCOUNTER
----- Message from Angelo Balderas sent at 4/28/2020  3:06 PM CDT -----  Contact: Patient   Doctor appointment and lab have been scheduled.  Please link lab orders to the lab appointment.  Date of doctor appointment:    Physical or EP:  5/26/20  Date of lab appointment:  5/18/20  Comments:     Thank you

## 2020-05-18 ENCOUNTER — LAB VISIT (OUTPATIENT)
Dept: LAB | Facility: HOSPITAL | Age: 70
End: 2020-05-18
Attending: HOSPITALIST
Payer: MEDICARE

## 2020-05-18 DIAGNOSIS — I10 ESSENTIAL HYPERTENSION: ICD-10-CM

## 2020-05-18 DIAGNOSIS — E03.9 HYPOTHYROIDISM, UNSPECIFIED TYPE: ICD-10-CM

## 2020-05-18 DIAGNOSIS — E55.9 VITAMIN D DEFICIENCY: ICD-10-CM

## 2020-05-18 LAB
25(OH)D3+25(OH)D2 SERPL-MCNC: 41 NG/ML (ref 30–96)
ALBUMIN SERPL BCP-MCNC: 3.8 G/DL (ref 3.5–5.2)
ALP SERPL-CCNC: 111 U/L (ref 55–135)
ALT SERPL W/O P-5'-P-CCNC: 14 U/L (ref 10–44)
ANION GAP SERPL CALC-SCNC: 6 MMOL/L (ref 8–16)
AST SERPL-CCNC: 19 U/L (ref 10–40)
BASOPHILS # BLD AUTO: 0.08 K/UL (ref 0–0.2)
BASOPHILS NFR BLD: 0.9 % (ref 0–1.9)
BILIRUB SERPL-MCNC: 0.3 MG/DL (ref 0.1–1)
BUN SERPL-MCNC: 15 MG/DL (ref 8–23)
CALCIUM SERPL-MCNC: 9.2 MG/DL (ref 8.7–10.5)
CHLORIDE SERPL-SCNC: 108 MMOL/L (ref 95–110)
CHOLEST SERPL-MCNC: 240 MG/DL (ref 120–199)
CHOLEST/HDLC SERPL: 4.1 {RATIO} (ref 2–5)
CO2 SERPL-SCNC: 26 MMOL/L (ref 23–29)
CREAT SERPL-MCNC: 0.7 MG/DL (ref 0.5–1.4)
DIFFERENTIAL METHOD: ABNORMAL
EOSINOPHIL # BLD AUTO: 0.4 K/UL (ref 0–0.5)
EOSINOPHIL NFR BLD: 4.9 % (ref 0–8)
ERYTHROCYTE [DISTWIDTH] IN BLOOD BY AUTOMATED COUNT: 13.8 % (ref 11.5–14.5)
EST. GFR  (AFRICAN AMERICAN): >60 ML/MIN/1.73 M^2
EST. GFR  (NON AFRICAN AMERICAN): >60 ML/MIN/1.73 M^2
GLUCOSE SERPL-MCNC: 88 MG/DL (ref 70–110)
HCT VFR BLD AUTO: 39.9 % (ref 37–48.5)
HDLC SERPL-MCNC: 59 MG/DL (ref 40–75)
HDLC SERPL: 24.6 % (ref 20–50)
HGB BLD-MCNC: 12.3 G/DL (ref 12–16)
IMM GRANULOCYTES # BLD AUTO: 0.02 K/UL (ref 0–0.04)
IMM GRANULOCYTES NFR BLD AUTO: 0.2 % (ref 0–0.5)
LDLC SERPL CALC-MCNC: 154.2 MG/DL (ref 63–159)
LYMPHOCYTES # BLD AUTO: 3.3 K/UL (ref 1–4.8)
LYMPHOCYTES NFR BLD: 38.1 % (ref 18–48)
MCH RBC QN AUTO: 29.8 PG (ref 27–31)
MCHC RBC AUTO-ENTMCNC: 30.8 G/DL (ref 32–36)
MCV RBC AUTO: 97 FL (ref 82–98)
MONOCYTES # BLD AUTO: 0.6 K/UL (ref 0.3–1)
MONOCYTES NFR BLD: 7.1 % (ref 4–15)
NEUTROPHILS # BLD AUTO: 4.2 K/UL (ref 1.8–7.7)
NEUTROPHILS NFR BLD: 48.8 % (ref 38–73)
NONHDLC SERPL-MCNC: 181 MG/DL
NRBC BLD-RTO: 0 /100 WBC
PLATELET # BLD AUTO: 239 K/UL (ref 150–350)
PMV BLD AUTO: 11.5 FL (ref 9.2–12.9)
POTASSIUM SERPL-SCNC: 4.4 MMOL/L (ref 3.5–5.1)
PROT SERPL-MCNC: 7.1 G/DL (ref 6–8.4)
RBC # BLD AUTO: 4.13 M/UL (ref 4–5.4)
SODIUM SERPL-SCNC: 140 MMOL/L (ref 136–145)
T4 FREE SERPL-MCNC: 1.28 NG/DL (ref 0.71–1.51)
TRIGL SERPL-MCNC: 134 MG/DL (ref 30–150)
TSH SERPL DL<=0.005 MIU/L-ACNC: 0.18 UIU/ML (ref 0.4–4)
WBC # BLD AUTO: 8.63 K/UL (ref 3.9–12.7)

## 2020-05-18 PROCEDURE — 82306 VITAMIN D 25 HYDROXY: CPT | Mod: HCNC

## 2020-05-18 PROCEDURE — 85025 COMPLETE CBC W/AUTO DIFF WBC: CPT | Mod: HCNC

## 2020-05-18 PROCEDURE — 84443 ASSAY THYROID STIM HORMONE: CPT | Mod: HCNC

## 2020-05-18 PROCEDURE — 84439 ASSAY OF FREE THYROXINE: CPT | Mod: HCNC

## 2020-05-18 PROCEDURE — 80061 LIPID PANEL: CPT | Mod: HCNC

## 2020-05-18 PROCEDURE — 36415 COLL VENOUS BLD VENIPUNCTURE: CPT | Mod: HCNC,PO

## 2020-05-18 PROCEDURE — 80053 COMPREHEN METABOLIC PANEL: CPT | Mod: HCNC

## 2020-05-19 ENCOUNTER — TELEPHONE (OUTPATIENT)
Dept: INTERNAL MEDICINE | Facility: CLINIC | Age: 70
End: 2020-05-19

## 2020-05-19 ENCOUNTER — LAB VISIT (OUTPATIENT)
Dept: LAB | Facility: HOSPITAL | Age: 70
End: 2020-05-19
Attending: HOSPITALIST
Payer: MEDICARE

## 2020-05-19 DIAGNOSIS — Z00.00 ENCOUNTER FOR PREVENTIVE HEALTH EXAMINATION: ICD-10-CM

## 2020-05-19 DIAGNOSIS — E03.9 HYPOTHYROIDISM, UNSPECIFIED TYPE: ICD-10-CM

## 2020-05-19 DIAGNOSIS — Z00.00 ENCOUNTER FOR PREVENTIVE HEALTH EXAMINATION: Primary | ICD-10-CM

## 2020-05-19 LAB
FERRITIN SERPL-MCNC: 28 NG/ML (ref 20–300)
MAGNESIUM SERPL-MCNC: 2.2 MG/DL (ref 1.6–2.6)
T3FREE SERPL-MCNC: 3.4 PG/ML (ref 2.3–4.2)

## 2020-05-19 PROCEDURE — 82728 ASSAY OF FERRITIN: CPT | Mod: HCNC

## 2020-05-19 PROCEDURE — 86376 MICROSOMAL ANTIBODY EACH: CPT | Mod: HCNC

## 2020-05-19 PROCEDURE — 84255 ASSAY OF SELENIUM: CPT | Mod: HCNC

## 2020-05-19 PROCEDURE — 36415 COLL VENOUS BLD VENIPUNCTURE: CPT | Mod: HCNC,PO

## 2020-05-19 PROCEDURE — 84630 ASSAY OF ZINC: CPT | Mod: HCNC

## 2020-05-19 PROCEDURE — 83735 ASSAY OF MAGNESIUM: CPT | Mod: HCNC

## 2020-05-19 PROCEDURE — 84481 FREE ASSAY (FT-3): CPT | Mod: HCNC

## 2020-05-19 PROCEDURE — 86800 THYROGLOBULIN ANTIBODY: CPT | Mod: HCNC

## 2020-05-19 NOTE — TELEPHONE ENCOUNTER
----- Message from Steffi Cerda MD sent at 5/19/2020  1:05 PM CDT -----  Rest of labs ordered. Medicare will not cover folate and b12 so unable to order

## 2020-05-20 LAB
THYROGLOB AB SERPL IA-ACNC: 9.5 IU/ML (ref 0–3.9)
THYROPEROXIDASE IGG SERPL-ACNC: <6 IU/ML

## 2020-05-22 LAB
SELENIUM SERPL-MCNC: 273.2 UG/L (ref 23–190)
ZINC SERPL-MCNC: 79 UG/DL (ref 60–130)

## 2020-05-26 ENCOUNTER — OFFICE VISIT (OUTPATIENT)
Dept: INTERNAL MEDICINE | Facility: CLINIC | Age: 70
End: 2020-05-26
Payer: MEDICARE

## 2020-05-26 VITALS
DIASTOLIC BLOOD PRESSURE: 64 MMHG | HEART RATE: 73 BPM | OXYGEN SATURATION: 96 % | RESPIRATION RATE: 18 BRPM | SYSTOLIC BLOOD PRESSURE: 190 MMHG | WEIGHT: 120.38 LBS | BODY MASS INDEX: 23.63 KG/M2 | HEIGHT: 60 IN | TEMPERATURE: 98 F

## 2020-05-26 DIAGNOSIS — H35.30 MACULAR DEGENERATION, UNSPECIFIED LATERALITY, UNSPECIFIED TYPE: ICD-10-CM

## 2020-05-26 DIAGNOSIS — Z12.12 SCREENING FOR COLORECTAL CANCER: ICD-10-CM

## 2020-05-26 DIAGNOSIS — I10 ESSENTIAL HYPERTENSION: ICD-10-CM

## 2020-05-26 DIAGNOSIS — Z00.00 ENCOUNTER FOR PREVENTIVE HEALTH EXAMINATION: Primary | ICD-10-CM

## 2020-05-26 DIAGNOSIS — Z12.11 SCREENING FOR COLORECTAL CANCER: ICD-10-CM

## 2020-05-26 DIAGNOSIS — E06.3 HYPOTHYROIDISM DUE TO HASHIMOTO'S THYROIDITIS: ICD-10-CM

## 2020-05-26 DIAGNOSIS — Z78.0 POSTMENOPAUSAL: ICD-10-CM

## 2020-05-26 DIAGNOSIS — E03.8 HYPOTHYROIDISM DUE TO HASHIMOTO'S THYROIDITIS: ICD-10-CM

## 2020-05-26 PROCEDURE — 3078F DIAST BP <80 MM HG: CPT | Mod: HCNC,CPTII,S$GLB, | Performed by: HOSPITALIST

## 2020-05-26 PROCEDURE — 99999 PR PBB SHADOW E&M-EST. PATIENT-LVL IV: CPT | Mod: PBBFAC,HCNC,, | Performed by: HOSPITALIST

## 2020-05-26 PROCEDURE — 3077F SYST BP >= 140 MM HG: CPT | Mod: HCNC,CPTII,S$GLB, | Performed by: HOSPITALIST

## 2020-05-26 PROCEDURE — 99499 UNLISTED E&M SERVICE: CPT | Mod: HCNC,S$GLB,, | Performed by: HOSPITALIST

## 2020-05-26 PROCEDURE — 3078F PR MOST RECENT DIASTOLIC BLOOD PRESSURE < 80 MM HG: ICD-10-PCS | Mod: HCNC,CPTII,S$GLB, | Performed by: HOSPITALIST

## 2020-05-26 PROCEDURE — 99999 PR PBB SHADOW E&M-EST. PATIENT-LVL IV: ICD-10-PCS | Mod: PBBFAC,HCNC,, | Performed by: HOSPITALIST

## 2020-05-26 PROCEDURE — 99499 RISK ADDL DX/OHS AUDIT: ICD-10-PCS | Mod: HCNC,S$GLB,, | Performed by: HOSPITALIST

## 2020-05-26 PROCEDURE — 3077F PR MOST RECENT SYSTOLIC BLOOD PRESSURE >= 140 MM HG: ICD-10-PCS | Mod: HCNC,CPTII,S$GLB, | Performed by: HOSPITALIST

## 2020-05-26 PROCEDURE — 99397 PER PM REEVAL EST PAT 65+ YR: CPT | Mod: HCNC,S$GLB,, | Performed by: HOSPITALIST

## 2020-05-26 PROCEDURE — 99397 PR PREVENTIVE VISIT,EST,65 & OVER: ICD-10-PCS | Mod: HCNC,S$GLB,, | Performed by: HOSPITALIST

## 2020-05-26 RX ORDER — LEVOTHYROXINE SODIUM 50 UG/1
50 CAPSULE ORAL EVERY MORNING
Qty: 30 CAPSULE | Refills: 2 | Status: SHIPPED | OUTPATIENT
Start: 2020-05-26 | End: 2020-05-28

## 2020-05-26 NOTE — PROGRESS NOTES
"Subjective:     @Patient ID: Tressa Quiroz is a 69 y.o. female.    Chief Complaint: Annual Exam    HPI    69 y.o. female here for annual exam. Has pmhx of htn, hypothyroidism, macular degeneration.      She reports she has been following with an ob/gyn Dr Chidi Garcia [address] [place] [facility] who has managed her autoimmune thyroid disease. She has reported in the past she would like to have labs testing minerals and vitamins as she states she has had multiple deficiencies that he was treating her for. She also reports she is on naltrexone for "adrenal fatigue." She also request to have thyroid antibodies ordered. Reports she has hashimoto's. She is retired and has 1 child. States her ob/gyn for female exam is Dr Sanchez. She is a vegetarian and exercises regularly. Has HTN was not taking meds regularly. She was seen in clinic Jan 2020 for HTN and weakness. Was started on amlodipine 5 mg qday and recommended to f/u in 2 weeks. She did not f/u.  Patient reports she does not like taking medications regularly.  States that she took amlodipine for a few weeks and then stopped.  Reports that she had issues with hydrochlorothiazide and lisinopril in the past.  However patient does not recall exact issues with a       Lipid disorders/ASCVD risk (ages >/= 45 or >/= 20 if increased risk ): ordered    Hepatitis C (one time if born between 9530-8533): utd 2019 negative  Eye exam: yearly eye exam. Utd done 10/2019  Breast Cancer (40-50y discretion of pt, 50-74y every 1-2 years): Mammogram Done with [facility] ob[facility] 12/2019.   Colorectal Cancer (normal risk 50-75yr): Colonoscopy: Due   DEXA (F>66 yo, M >71yo, M&F 50-70 yo with risk factors (smoking, previous fx, wt <70kg; etoh abuse, chronic steroids, RA)): Due     Vaccines:  Patient declines all vaccinations  Influenza (yearly) declines   Tetanus (every 10 yrs - 1st tdap): last 2005 declines.    PPSV23(>66yo or <65 w/ lung dz, smoking, DM) declines   PCV13 (> 65 or " <65 w/ immunocompromised) declines  Zoster (>61yo) declines      Exercise:  Walking daily.   Diet:  vegetarian        Review of Systems   Constitutional: Negative for chills and fever.   HENT: Negative for congestion and sore throat.    Eyes: Negative for pain and visual disturbance.   Respiratory: Negative for cough and shortness of breath.    Cardiovascular: Negative for chest pain and leg swelling.   Gastrointestinal: Negative for abdominal pain, nausea and vomiting.   Endocrine: Negative for polydipsia and polyuria.   Genitourinary: Negative for difficulty urinating and dysuria.   Musculoskeletal: Negative for arthralgias and back pain.   Skin: Negative for rash and wound.   Neurological: Negative for dizziness, weakness and headaches.   Psychiatric/Behavioral: Negative for agitation and confusion.     Past medical history, surgical history, and family medical history reviewed and updated as appropriate.    Medications and allergies reviewed.     Objective:     Vitals:    05/26/20 1456   BP: (!) 190/70   BP Location: Right arm   Patient Position: Sitting   BP Method: Medium (Manual)   Pulse: 73   Resp: 18   Temp: 98.1 °F (36.7 °C)   TempSrc: Temporal   SpO2: 96%   Weight: 54.6 kg (120 lb 5.9 oz)   Height: 5' (1.524 m)     Body mass index is 23.51 kg/m².  Physical Exam   Constitutional: She is oriented to person, place, and time. She appears well-developed and well-nourished. No distress.   HENT:   Head: Normocephalic and atraumatic.   Right Ear: External ear normal.   Left Ear: External ear normal.   Mouth/Throat: Oropharynx is clear and moist. No oropharyngeal exudate.   Eyes: Conjunctivae are normal. Right eye exhibits no discharge. Left eye exhibits no discharge.   Neck: Normal range of motion. Neck supple.   Cardiovascular: Normal rate and regular rhythm. Exam reveals no friction rub.   Murmur heard.  2/6 systolic murmur   Pulmonary/Chest: Effort normal and breath sounds normal.   Abdominal: Soft. Bowel  sounds are normal. She exhibits no distension. There is no tenderness. There is no guarding.   Musculoskeletal: Normal range of motion. She exhibits no edema.   Lymphadenopathy:     She has no cervical adenopathy.   Neurological: She is alert and oriented to person, place, and time.   Skin: Skin is warm and dry.   Psychiatric: She has a normal mood and affect. Her behavior is normal.   Vitals reviewed.      Lab Results   Component Value Date    WBC 8.63 05/18/2020    HGB 12.3 05/18/2020    HCT 39.9 05/18/2020     05/18/2020    CHOL 240 (H) 05/18/2020    TRIG 134 05/18/2020    HDL 59 05/18/2020    ALT 14 05/18/2020    AST 19 05/18/2020     05/18/2020    K 4.4 05/18/2020     05/18/2020    CREATININE 0.7 05/18/2020    BUN 15 05/18/2020    CO2 26 05/18/2020    TSH 0.176 (L) 05/18/2020       Assessment:     1. Encounter for preventive health examination    2. Essential hypertension    3. Hypothyroidism due to Hashimoto's thyroiditis    4. Macular degeneration, unspecified laterality, unspecified type    5. Screening for colorectal cancer    6. Postmenopausal      Plan:   Tressa was seen today for annual exam.    Diagnoses and all orders for this visit:    Encounter for preventive health examination        - Reviewed labs with patient in clinic.  Patient declines all vaccinations.  Patient is up-to-date on mammogram.  Patient is due for colonoscopy this year.      Essential hypertension        - BP uncontrolled. Due to non-compliance. Pt counseled on starting amlodipine.  Patient given blood pressure log and will return to clinic in 2 weeks.    Hypothyroidism due to Hashimoto's thyroiditis  -  Patient's TSH level was suppressed: will decrease levothyroxine to 50 mcg daily.  Will recheck thyroid labs in 2 months  -     TSH; Future  -     T4, free; Future    Macular degeneration, unspecified laterality, unspecified type         -  continue yearly follow-up with [facility] doctor [facility].    Screening  for colorectal cancer  -     Case request GI: COLONOSCOPY    Postmenopausal  -     DXA Bone Density Spine And Hip; Future    Other orders  -     levothyroxine (TIROSINT) 50 mcg Cap; Take 50 mcg by mouth every morning.      This office note was created using [employer]ictation.  Any errors in spelling or syntax may not have been identified and corrected on initial review prior to signing this note.      RTC 2 weeks for BP check    Steffi Cerda MD  Internal Medicine    5/26/2020

## 2020-05-27 ENCOUNTER — TELEPHONE (OUTPATIENT)
Dept: INTERNAL MEDICINE | Facility: CLINIC | Age: 70
End: 2020-05-27

## 2020-05-28 RX ORDER — LEVOTHYROXINE SODIUM 50 UG/1
50 TABLET ORAL
Qty: 30 TABLET | Refills: 2 | Status: SHIPPED | OUTPATIENT
Start: 2020-05-28 | End: 2020-11-10

## 2020-06-18 ENCOUNTER — TELEPHONE (OUTPATIENT)
Dept: INTERNAL MEDICINE | Facility: CLINIC | Age: 70
End: 2020-06-18

## 2020-06-18 NOTE — TELEPHONE ENCOUNTER
----- Message from Sandie Mixon sent at 6/18/2020  3:14 PM CDT -----  Contact: Patient [phone]  Patient is requesting a call about a personal matter.    Please call and advise.    Thank You

## 2020-06-18 NOTE — TELEPHONE ENCOUNTER
The patient wanted to know if she can get a Rx for legalized Marijuana. I explained to her that we can not write for that Rx.

## 2020-07-23 ENCOUNTER — OFFICE VISIT (OUTPATIENT)
Dept: URGENT CARE | Facility: CLINIC | Age: 70
End: 2020-07-23
Payer: MEDICARE

## 2020-07-23 VITALS
OXYGEN SATURATION: 96 % | HEART RATE: 72 BPM | SYSTOLIC BLOOD PRESSURE: 157 MMHG | DIASTOLIC BLOOD PRESSURE: 68 MMHG | WEIGHT: 120 LBS | BODY MASS INDEX: 23.56 KG/M2 | HEIGHT: 60 IN | TEMPERATURE: 98 F
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
## 2020-07-23 PROCEDURE — 90471 IMMUNIZATION ADMIN: CPT | Mod: S$GLB,,, | Performed by: INTERNAL MEDICINE

## 2020-07-23 PROCEDURE — 99214 PR OFFICE/OUTPT VISIT, EST, LEVL IV, 30-39 MIN: ICD-10-PCS | Mod: 25,S$GLB,, | Performed by: INTERNAL MEDICINE

## 2020-07-23 PROCEDURE — 90714 TD VACC NO PRESV 7 YRS+ IM: CPT | Mod: S$GLB,,, | Performed by: INTERNAL MEDICINE

## 2020-07-23 PROCEDURE — 90714 TD VACCINE GREATER THAN OR EQUAL TO 7YO WITH PRESERVATIVE IM: ICD-10-PCS | Mod: S$GLB,,, | Performed by: INTERNAL MEDICINE

## 2020-07-23 PROCEDURE — 99214 OFFICE O/P EST MOD 30 MIN: CPT | Mod: 25,S$GLB,, | Performed by: INTERNAL MEDICINE

## 2020-07-23 PROCEDURE — 90471 TD VACCINE GREATER THAN OR EQUAL TO 7YO WITH PRESERVATIVE IM: ICD-10-PCS | Mod: S$GLB,,, | Performed by: INTERNAL MEDICINE

## 2020-07-23 RX ORDER — DOXYCYCLINE 100 MG/1
100 CAPSULE ORAL 2 TIMES DAILY
Qty: 14 CAPSULE | Refills: 0 | Status: SHIPPED | OUTPATIENT
Start: 2020-07-23 | End: 2020-07-30

## 2020-07-23 NOTE — PROGRESS NOTES
Subjective:       Patient ID: Tressa Quiroz is a 69 y.o. female.    Vitals:  height is 5' (1.524 m) and weight is 54.4 kg (120 lb). Her temperature is 97.9 °F (36.6 °C). Her blood pressure is 157/68 (abnormal) and her pulse is 72. Her oxygen saturation is 96%.     Chief Complaint: Animal Bite    Animal Bite   The incident occurred just prior to arrival (this morning). The incident occurred at home. There is an injury to the right hand. The pain is mild. It is unlikely that a foreign body is present. Pertinent negatives include no chest pain, no fussiness, no numbness, no visual disturbance, no abdominal pain, no bowel incontinence, no nausea, no vomiting, no bladder incontinence, no headaches, no hearing loss, no inability to bear weight, no neck pain, no pain when bearing weight, no focal weakness, no decreased responsiveness, no light-headedness, no loss of consciousness, no seizures, no tingling, no weakness, no cough, no difficulty breathing and no memory loss. There have been no prior injuries to these areas. She is left-handed. Her tetanus status is out of date. There were no sick contacts.       Constitution: Negative for fatigue.   HENT: Negative for hearing loss, facial swelling and facial trauma.    Neck: Negative for neck pain and neck stiffness.   Cardiovascular: Negative for chest trauma and chest pain.   Eyes: Negative for eye trauma, double vision and blurred vision.   Respiratory: Negative for cough.    Gastrointestinal: Negative for abdominal trauma, abdominal pain, nausea, vomiting, rectal bleeding and bowel incontinence.   Genitourinary: Negative for bladder incontinence, hematuria, missed menses, genital trauma and pelvic pain.   Musculoskeletal: Negative for pain, trauma, joint swelling and abnormal ROM of joint.   Skin: Negative for color change, wound, abrasion, laceration and bruising.   Neurological: Negative for dizziness, history of vertigo, light-headedness, focal weakness,  coordination disturbances, headaches, altered mental status, loss of consciousness, numbness and seizures.   Hematologic/Lymphatic: Negative for history of bleeding disorder.   Psychiatric/Behavioral: Negative for altered mental status.       Objective:      Physical Exam   HENT:   Head: Normocephalic and atraumatic.   Abdominal: Normal appearance.   Neurological: She is alert.   Skin:   multiple puncture wounds 2 nd digit R hand    Comments: multiple puncture wounds 2 nd digit R hand     Nursing note and vitals reviewed.        Assessment:       1. Cat bite, initial encounter        Plan:         Cat bite, initial encounter  -     doxycycline (VIBRAMYCIN) 100 MG Cap; Take 1 capsule (100 mg total) by mouth 2 (two) times daily. for 7 days  Dispense: 14 capsule; Refill: 0

## 2020-07-27 ENCOUNTER — LAB VISIT (OUTPATIENT)
Dept: LAB | Facility: HOSPITAL | Age: 70
End: 2020-07-27
Attending: HOSPITALIST
Payer: MEDICARE

## 2020-07-27 DIAGNOSIS — E03.8 HYPOTHYROIDISM DUE TO HASHIMOTO'S THYROIDITIS: ICD-10-CM

## 2020-07-27 DIAGNOSIS — E06.3 HYPOTHYROIDISM DUE TO HASHIMOTO'S THYROIDITIS: ICD-10-CM

## 2020-07-27 LAB
T4 FREE SERPL-MCNC: 1.11 NG/DL (ref 0.71–1.51)
TSH SERPL DL<=0.005 MIU/L-ACNC: 5.13 UIU/ML (ref 0.4–4)

## 2020-07-27 PROCEDURE — 84443 ASSAY THYROID STIM HORMONE: CPT | Mod: HCNC

## 2020-07-27 PROCEDURE — 84439 ASSAY OF FREE THYROXINE: CPT | Mod: HCNC

## 2020-07-27 PROCEDURE — 36415 COLL VENOUS BLD VENIPUNCTURE: CPT | Mod: HCNC,PO

## 2020-07-30 ENCOUNTER — TELEPHONE (OUTPATIENT)
Dept: INTERNAL MEDICINE | Facility: CLINIC | Age: 70
End: 2020-07-30

## 2020-07-30 NOTE — TELEPHONE ENCOUNTER
----- Message from Steffi Cerda MD sent at 7/28/2020  5:28 PM CDT -----  Please notify pt that TSH is improved to 5.126 but still mildly elevated. Will continue current dose for now. Does she still see her endocrinologist? If so, should let them know about result.

## 2020-08-04 ENCOUNTER — TELEPHONE (OUTPATIENT)
Dept: INTERNAL MEDICINE | Facility: CLINIC | Age: 70
End: 2020-08-04

## 2020-08-04 DIAGNOSIS — E03.8 HYPOTHYROIDISM DUE TO HASHIMOTO'S THYROIDITIS: Primary | ICD-10-CM

## 2020-08-04 DIAGNOSIS — E06.3 HYPOTHYROIDISM DUE TO HASHIMOTO'S THYROIDITIS: Primary | ICD-10-CM

## 2020-08-04 NOTE — TELEPHONE ENCOUNTER
The patient informed of her results and verbalized understanding.    Please place referral to endocrinology.

## 2020-08-05 ENCOUNTER — TELEPHONE (OUTPATIENT)
Dept: INTERNAL MEDICINE | Facility: CLINIC | Age: 70
End: 2020-08-05

## 2020-08-05 NOTE — TELEPHONE ENCOUNTER
----- Message from Cornelia Ramey sent at 8/5/2020  1:51 PM CDT -----  Type:  Patient Returning Call    Who Called:GERALD     Who Left Message for Patient:NURSE    Does the patient know what this is regarding?:NA    Would the patient rather a call back or a response via [facility]? CALL BACK     Best Call Back Number:[phone]    Additional Information: RETURNING MISSED CALL. THE PT WOULD THE NURSE TO KNOW THAT THE THS IS STILL HIGH BECAUSE SHE IS TAKING HANKS'S IODINE

## 2020-08-12 ENCOUNTER — PATIENT OUTREACH (OUTPATIENT)
Dept: ADMINISTRATIVE | Facility: OTHER | Age: 70
End: 2020-08-12

## 2020-08-13 ENCOUNTER — OFFICE VISIT (OUTPATIENT)
Dept: ENDOCRINOLOGY | Facility: CLINIC | Age: 70
End: 2020-08-13
Payer: MEDICARE

## 2020-08-13 VITALS
HEIGHT: 60 IN | WEIGHT: 121.06 LBS | BODY MASS INDEX: 23.77 KG/M2 | HEART RATE: 73 BPM | SYSTOLIC BLOOD PRESSURE: 140 MMHG | DIASTOLIC BLOOD PRESSURE: 80 MMHG

## 2020-08-13 DIAGNOSIS — E06.3 HYPOTHYROIDISM DUE TO HASHIMOTO'S THYROIDITIS: ICD-10-CM

## 2020-08-13 DIAGNOSIS — E03.8 HYPOTHYROIDISM DUE TO HASHIMOTO'S THYROIDITIS: ICD-10-CM

## 2020-08-13 PROCEDURE — 1159F PR MEDICATION LIST DOCUMENTED IN MEDICAL RECORD: ICD-10-PCS | Mod: HCNC,S$GLB,, | Performed by: INTERNAL MEDICINE

## 2020-08-13 PROCEDURE — 3077F PR MOST RECENT SYSTOLIC BLOOD PRESSURE >= 140 MM HG: ICD-10-PCS | Mod: HCNC,CPTII,S$GLB, | Performed by: INTERNAL MEDICINE

## 2020-08-13 PROCEDURE — 3079F DIAST BP 80-89 MM HG: CPT | Mod: HCNC,CPTII,S$GLB, | Performed by: INTERNAL MEDICINE

## 2020-08-13 PROCEDURE — 1126F AMNT PAIN NOTED NONE PRSNT: CPT | Mod: HCNC,S$GLB,, | Performed by: INTERNAL MEDICINE

## 2020-08-13 PROCEDURE — 3008F PR BODY MASS INDEX (BMI) DOCUMENTED: ICD-10-PCS | Mod: HCNC,CPTII,S$GLB, | Performed by: INTERNAL MEDICINE

## 2020-08-13 PROCEDURE — 1101F PT FALLS ASSESS-DOCD LE1/YR: CPT | Mod: HCNC,CPTII,S$GLB, | Performed by: INTERNAL MEDICINE

## 2020-08-13 PROCEDURE — 99499 UNLISTED E&M SERVICE: CPT | Mod: HCNC,S$GLB,, | Performed by: INTERNAL MEDICINE

## 2020-08-13 PROCEDURE — 1159F MED LIST DOCD IN RCRD: CPT | Mod: HCNC,S$GLB,, | Performed by: INTERNAL MEDICINE

## 2020-08-13 PROCEDURE — 1126F PR PAIN SEVERITY QUANTIFIED, NO PAIN PRESENT: ICD-10-PCS | Mod: HCNC,S$GLB,, | Performed by: INTERNAL MEDICINE

## 2020-08-13 PROCEDURE — 99203 PR OFFICE/OUTPT VISIT, NEW, LEVL III, 30-44 MIN: ICD-10-PCS | Mod: HCNC,S$GLB,, | Performed by: INTERNAL MEDICINE

## 2020-08-13 PROCEDURE — 99999 PR PBB SHADOW E&M-EST. PATIENT-LVL III: ICD-10-PCS | Mod: PBBFAC,HCNC,, | Performed by: INTERNAL MEDICINE

## 2020-08-13 PROCEDURE — 3077F SYST BP >= 140 MM HG: CPT | Mod: HCNC,CPTII,S$GLB, | Performed by: INTERNAL MEDICINE

## 2020-08-13 PROCEDURE — 99499 RISK ADDL DX/OHS AUDIT: ICD-10-PCS | Mod: HCNC,S$GLB,, | Performed by: INTERNAL MEDICINE

## 2020-08-13 PROCEDURE — 1101F PR PT FALLS ASSESS DOC 0-1 FALLS W/OUT INJ PAST YR: ICD-10-PCS | Mod: HCNC,CPTII,S$GLB, | Performed by: INTERNAL MEDICINE

## 2020-08-13 PROCEDURE — 3079F PR MOST RECENT DIASTOLIC BLOOD PRESSURE 80-89 MM HG: ICD-10-PCS | Mod: HCNC,CPTII,S$GLB, | Performed by: INTERNAL MEDICINE

## 2020-08-13 PROCEDURE — 3008F BODY MASS INDEX DOCD: CPT | Mod: HCNC,CPTII,S$GLB, | Performed by: INTERNAL MEDICINE

## 2020-08-13 PROCEDURE — 99203 OFFICE O/P NEW LOW 30 MIN: CPT | Mod: HCNC,S$GLB,, | Performed by: INTERNAL MEDICINE

## 2020-08-13 PROCEDURE — 99999 PR PBB SHADOW E&M-EST. PATIENT-LVL III: CPT | Mod: PBBFAC,HCNC,, | Performed by: INTERNAL MEDICINE

## 2020-08-13 NOTE — PROGRESS NOTES
Requested updates from Care Everywhere.  Reviewed chart for overdue QUETA topics.  Updated Health Maintenance.   Reconciled immunizations in LINKS.  Colonoscopy open case request 05/26/2020

## 2020-08-13 NOTE — ASSESSMENT & PLAN NOTE
She is clinically euthyroid but given slightly elevated TSH on levothyroxine 50 mcg daily, will have her increase by 1 pill a week and repeat labs in 2 months.  We discussed that I do not think she needs to take any iodine supplementation as she should be getting sufficient iodine from her diet (no longer vegetarian and is eating seafood).    I suggested that she not take any supplements within 4 hr of levothyroxine to prevent any issues with absorption.

## 2020-08-13 NOTE — PROGRESS NOTES
ENDOCRINOLOGY INITIAL VISIT  08/13/2020    Reason for Visit:  Tressa Quiroz is a 69 y.o. female referred by Steffi Cerda MD for evaluation of abnormal thyroid function tests    HPI:    Diagnosed w/ hypothyroidism in her early 40's diagnosed on evaluation for fatigue.  She started LT4 at that time and over the years has been on fluctuating doses.  Last year she stared taking iodine supplement (drops) and her dose of levothyroxine decreased from 75 to 50 mcg daily     Started on levothyroxine 50 mcg daily after TSH was low in 5/2020.  She takes levothyroxine first thing in the morning on an empty stomach and waits at least half an hour to eat.  She is on several supplements which patient states she takes for Sjogren's disease and adrenal fatigue, some of which she takes in the morning.  She denies use of calcium supplement or iron.    She is clinically euthyroid.    Family Hx of Thyroid disease: denies    Denies rapid neck enlargement, difficulty swallowing, SOB, or hoarseness.     She monitors blood pressure at home and it is at goal.  She denies chest pain, shortness of breath, lower extremity edema.  She takes amlodipine for hypertension.     Ref. Range 10/2/2012 11:47 3/29/2019 08:27 5/18/2020 16:30 5/19/2020 14:00 7/27/2020 10:49   TSH Latest Ref Range: 0.400 - 4.000 uIU/mL 2.23 0.117 (L) 0.176 (L)  5.126 (H)   T3, Free Latest Ref Range: 2.3 - 4.2 pg/mL 3.1   3.4    T4, Free Latest Ref Range: 0.8 - 1.8 ng/dL 1.1       Free T4 Latest Ref Range: 0.71 - 1.51 ng/dL  1.24 1.28  1.11   Thyroperoxidase Antibodies Latest Ref Range: <6.0 IU/mL  13.4 (H)  <6.0    Thyroglobulin Ab Screen Latest Ref Range: 0.0 - 3.9 IU/mL  13.8 (H)  9.5 (H)      Thyroid Symptoms  Normal energy since starting medical marijuana for sleep, sleeping better now    Weight change:  []  Gain []  Loss  [x]  Denies     Temperature intolerance:  []  Cold []  Hot   [x]  Denies     GI:  []  Diarrhea []  Constipation [x]   Denies    Integument:  []  Hair loss []  Dry skin  [x]  Denies    Other:  []  Palpitation []  tremor     []  Increased anxiety    [x]  Denies     Past Medical History:   Diagnosis Date    Allergy     AR (allergic rhinitis) 10/1/2012    Basal cell carcinoma of nasal tip 10/1/2012    Hair loss disorder 10/1/2012    Hypertension     Hypothyroidism 10/1/2012    Thyroid disease        Past Surgical History:   Procedure Laterality Date    BUNIONECTOMY         Review of patient's allergies indicates:   Allergen Reactions    Cefuroxime axetil     Cough syrup  [guaifenesin]     Ergocalciferol (vitamin d2)     Vitamin d analogue      Joint pain. Only with vitamin D2          Current Outpatient Medications:     amLODIPine (NORVASC) 5 MG tablet, Take 1 tablet (5 mg total) by mouth once daily. (Patient not taking: Reported on 7/23/2020), Disp: 30 tablet, Rfl: 11    levothyroxine (SYNTHROID) 50 MCG tablet, Take 1 tablet (50 mcg total) by mouth before breakfast., Disp: 30 tablet, Rfl: 2    naltrexone capsule, Take 4.5 mg by mouth every evening. , Disp: , Rfl: 99    Social History     Socioeconomic History    Marital status:      Spouse name: Not on file    Number of children: 1    Years of education: Not on file    Highest education level: Not on file   Occupational History     Comment: Retired  worker   Social Needs    Financial resource strain: Not on file    Food insecurity     Worry: Not on file     Inability: Not on file    Transportation needs     Medical: Not on file     Non-medical: Not on file   Tobacco Use    Smoking status: Never Smoker    Tobacco comment: 1 daughter, [employer].   Substance and Sexual Activity    Alcohol use: No    Drug use: No    Sexual activity: Not on file   Lifestyle    Physical activity     Days per week: Not on file     Minutes per session: Not on file    Stress: Not on file   Relationships    Social connections     Talks on phone: Not on file      Gets together: Not on file     Attends Congregation service: Not on file     Active member of club or organization: Not on file     Attends meetings of clubs or organizations: Not on file     Relationship status: Not on file   Other Topics Concern    Not on file   Social History Narrative    Not on file       Family History   Problem Relation Age of Onset    Hyperlipidemia Father     Cancer Brother         lymphoma    Stroke Maternal Aunt     Allergic rhinitis Neg Hx     Allergies Neg Hx     Angioedema Neg Hx     Asthma Neg Hx     Atopy Neg Hx     Eczema Neg Hx     Rhinitis Neg Hx     Immunodeficiency Neg Hx     Urticaria Neg Hx        REVIEW OF SYSTEMS  A 14 point ROS was obtained with pertinent positives and negatives per HPI, all others negative.       PHYSICAL EXAM  BP (!) 140/80   Pulse 73   Ht 5' (1.524 m)   Wt 54.9 kg (121 lb 0.5 oz)   BMI 23.64 kg/m²   Wt Readings from Last 3 Encounters:   07/23/20 54.4 kg (120 lb)   05/26/20 54.6 kg (120 lb 5.9 oz)   01/03/20 55.2 kg (121 lb 11.1 oz)   ]    Constitutional:  Pleasant,  in no acute distress.   HENT:   Head:    Normocephalic and atraumatic.   Mouth/Throat:   Oropharynx is clear and moist. No oropharyngeal exudate.   Eyes:    EOMI.  No scleral icterus.   Neck:    No thyromegaly or palpable thyroid nodules, no cervical LAD  Cardiovascular:  Normal rate, regular rhythm, 2+ radial pulses bilaterally  Respiratory:   Effort normal and breath sounds normal.   Gastrointestinal: Soft, nontender  Neurological:  No tremor of outstretched hands, normal speech  Skin:    Skin is warm, dry  Psych:   Normal mood and affect.   Extremity:  No edema or wounds, no deformity    LABORATORY REVIEW:  Thyroid Labs Latest Ref Rng & Units 5/18/2020 5/19/2020 7/27/2020   TSH 0.400 - 4.000 uIU/mL 0.176(L) - 5.126(H)   Free T4 0.71 - 1.51 ng/dL 1.28 - 1.11   Sodium 136 - 145 mmol/L 140 - -   Potassium 3.5 - 5.1 mmol/L 4.4 - -   Chloride 95 - 110 mmol/L 108 - -   Carbon  Dioxide 23 - 29 mmol/L 26 - -   Glucose 70 - 110 mg/dL 88 - -   Blood Urea Nitrogen 8 - 23 mg/dL 15 - -   Creatinine 0.5 - 1.4 mg/dL 0.7 - -   Calcium 8.7 - 10.5 mg/dL 9.2 - -   Total Protein 6.0 - 8.4 g/dL 7.1 - -   Albumin 3.5 - 5.2 g/dL 3.8 - -   Total Bilirubin 0.1 - 1.0 mg/dL 0.3 - -   AST 10 - 40 U/L 19 - -   ALT 10 - 44 U/L 14 - -   Anion Gap 8 - 16 mmol/L 6(L) - -   eGFR (African American) >60 mL/min/1.73 m:2 >60.0 - -   eGFR (Non-African American) >60 mL/min/1.73 m:2 >60.0 - -   WBC 3.90 - 12.70 K/uL 8.63 - -   RBC 4.00 - 5.40 M/uL 4.13 - -   Hemoglobin 12.0 - 16.0 g/dL 12.3 - -   Hematocrit 37.0 - 48.5 % 39.9 - -   MCV 82 - 98 fL 97 - -   MCH 27.0 - 31.0 pg 29.8 - -   MCHC 32.0 - 36.0 g/dL 30.8(L) - -   RDW 11.5 - 14.5 % 13.8 - -   Platelets 150 - 350 K/uL 239 - -   MPV 9.2 - 12.9 fL 11.5 - -   Gran # 1.8 - 7.7 K/uL 4.2 - -   Lymph # 1.0 - 4.8 K/uL 3.3 - -   Mono # 0.3 - 1.0 K/uL 0.6 - -   Eos # 0.0 - 0.5 K/uL 0.4 - -   Baso # 0.00 - 0.20 K/uL 0.08 - -   Gran % 38.0 - 73.0 % 48.8 - -   Lymph % 18.0 - 48.0 % 38.1 - -   Mono% 4.0 - 15.0 % 7.1 - -   Eos % 0.0 - 8.0 % 4.9 - -   Baso % 0.0 - 1.9 % 0.9 - -   Thyroglobulin, Antibody Screen 0.0 - 3.9 IU/mL - 9.5(H) -   Thyroperoxidase Antibodies <6.0 IU/mL - <6.0 -        ASSESSMENT/PLAN    Problem List Items Addressed This Visit        Unprioritized    Hypothyroidism     She is clinically euthyroid but given slightly elevated TSH on levothyroxine 50 mcg daily, will have her increase by 1 pill a week and repeat labs in 2 months.  We discussed that I do not think she needs to take any iodine supplementation as she should be getting sufficient iodine from her diet (no longer vegetarian and is eating seafood).    I suggested that she not take any supplements within 4 hr of levothyroxine to prevent any issues with absorption.         Relevant Orders    TSH          TSH in 2 months, f/u in 1 year    Lynn Capellan MD

## 2020-08-13 NOTE — LETTER
August 13, 2020      Steffi Cerda MD  [address]  [place] [address]           [facility]  [address]  [facility] [address]  Phone: [phone]  Fax: [phone]          Patient: Tressa Quiroz   MR Number: [id number]   YOB: 1950   Date of Visit: 8/13/2020       Dear Dr. Steffi Cerda:    Thank you for referring Tressa Quiroz to me for evaluation. Attached you will find relevant portions of my assessment and plan of care.    If you have questions, please do not hesitate to call me. I look forward to following Tressa Quiroz along with you.    Sincerely,    Lynn Capellan MD    Enclosure  CC:  No Recipients    If you would like to receive this communication electronically, please contact [email or web address] or [phone] to request more information on [facility] access.    For providers and/or their staff who would like to refer a patient to [facility], please contact us through our one-stop-shop provider referral line, [facility], at [phone].    If you feel you have received this communication in error or would no longer like to receive these types of communications, please e-mail [email or web address]

## 2020-08-13 NOTE — PATIENT INSTRUCTIONS
Please take an extra pill of levothyroxine 50 mcg once a week.  (total of 8 pills in a week)    Will repeat TSH lab in 2 months.            Thank you for enrolling in [facility]. Please follow the instructions below to securely access your online medical record. My allows you to send messages to your doctor, view your test results, renew your prescriptions, schedule appointments, and more.     How Do I Sign Up?  1. In your Internet browser, go to [email or web address].  2. In the lower right of the page, click the Activate Now link located under the Have Access Code? Title.  3. Enter your [facility] Access Code exactly as it appears below. You will not need to use this code after youve completed the sign-up process. If you do not sign up before the expiration date, you must request a new code.  [facility] Access Code: [email or web address]  Expires: 9/27/2020  3:35 PM    4. Enter Date of Birth (mm/dd/yyyy) as indicated and click the Next button. You will be taken to the next sign-up page.  5. Create a [facility] ID. This will be your new [facility] login ID and cannot be changed, so think of one that is secure and easy to remember.  6. Create a [facility] password.  Your password must be at least 8 characters long and contain at least 1 letter and 1 number.  You can change your password at any time.  7. Enter your Password Reset Question and Answer, then click the Next button.   8. Enter your e-mail address. You will receive e-mail notification when new information is available in [facility].  9. Click Sign Up. You can now view your medical record.     Additional Information  If you have questions, you can email [email or web address] or call [phone]  to talk to our [facility] staff. Remember, [facility] is NOT to be used for urgent needs. For medical emergencies, dial 911.

## 2020-08-22 LAB — HEMOCCULT STL QL IA: NEGATIVE

## 2020-10-14 ENCOUNTER — LAB VISIT (OUTPATIENT)
Dept: LAB | Facility: HOSPITAL | Age: 70
End: 2020-10-14
Attending: INTERNAL MEDICINE
Payer: MEDICARE

## 2020-10-14 DIAGNOSIS — E03.8 HYPOTHYROIDISM DUE TO HASHIMOTO'S THYROIDITIS: ICD-10-CM

## 2020-10-14 DIAGNOSIS — E06.3 HYPOTHYROIDISM DUE TO HASHIMOTO'S THYROIDITIS: ICD-10-CM

## 2020-10-14 LAB — TSH SERPL DL<=0.005 MIU/L-ACNC: 3.67 UIU/ML (ref 0.4–4)

## 2020-10-14 PROCEDURE — 36415 COLL VENOUS BLD VENIPUNCTURE: CPT | Mod: HCNC,PO

## 2020-10-14 PROCEDURE — 84443 ASSAY THYROID STIM HORMONE: CPT | Mod: HCNC

## 2020-10-16 ENCOUNTER — TELEPHONE (OUTPATIENT)
Dept: INTERNAL MEDICINE | Facility: CLINIC | Age: 70
End: 2020-10-16

## 2020-10-16 NOTE — TELEPHONE ENCOUNTER
----- Message from Steffi Ca sent at 10/16/2020  3:25 PM CDT -----  Contact: [phone]  Pt is taking bp medication and she thinks her vision has been affected by this she would like to recommend olmesartan

## 2020-10-16 NOTE — TELEPHONE ENCOUNTER
The patient is requesting a b/p medication. She was taking Norvasc   But stated that it was causing vision problems. She has been off of medication since then. She states that her b/p has been elevated ,systolic 180 diastolic? An appointment scheduled.

## 2020-10-21 ENCOUNTER — OFFICE VISIT (OUTPATIENT)
Dept: INTERNAL MEDICINE | Facility: CLINIC | Age: 70
End: 2020-10-21
Payer: MEDICARE

## 2020-10-21 ENCOUNTER — PATIENT OUTREACH (OUTPATIENT)
Dept: ADMINISTRATIVE | Facility: HOSPITAL | Age: 70
End: 2020-10-21

## 2020-10-21 VITALS
RESPIRATION RATE: 14 BRPM | DIASTOLIC BLOOD PRESSURE: 70 MMHG | HEART RATE: 64 BPM | HEIGHT: 60 IN | TEMPERATURE: 98 F | SYSTOLIC BLOOD PRESSURE: 180 MMHG | WEIGHT: 120.56 LBS | BODY MASS INDEX: 23.67 KG/M2

## 2020-10-21 DIAGNOSIS — I10 ESSENTIAL HYPERTENSION: Primary | ICD-10-CM

## 2020-10-21 PROCEDURE — 1101F PT FALLS ASSESS-DOCD LE1/YR: CPT | Mod: HCNC,CPTII,S$GLB, | Performed by: HOSPITALIST

## 2020-10-21 PROCEDURE — 1159F PR MEDICATION LIST DOCUMENTED IN MEDICAL RECORD: ICD-10-PCS | Mod: HCNC,S$GLB,, | Performed by: HOSPITALIST

## 2020-10-21 PROCEDURE — 3078F PR MOST RECENT DIASTOLIC BLOOD PRESSURE < 80 MM HG: ICD-10-PCS | Mod: HCNC,CPTII,S$GLB, | Performed by: HOSPITALIST

## 2020-10-21 PROCEDURE — 3077F PR MOST RECENT SYSTOLIC BLOOD PRESSURE >= 140 MM HG: ICD-10-PCS | Mod: HCNC,CPTII,S$GLB, | Performed by: HOSPITALIST

## 2020-10-21 PROCEDURE — 1126F AMNT PAIN NOTED NONE PRSNT: CPT | Mod: HCNC,S$GLB,, | Performed by: HOSPITALIST

## 2020-10-21 PROCEDURE — 3077F SYST BP >= 140 MM HG: CPT | Mod: HCNC,CPTII,S$GLB, | Performed by: HOSPITALIST

## 2020-10-21 PROCEDURE — 99213 PR OFFICE/OUTPT VISIT, EST, LEVL III, 20-29 MIN: ICD-10-PCS | Mod: HCNC,S$GLB,, | Performed by: HOSPITALIST

## 2020-10-21 PROCEDURE — 1101F PR PT FALLS ASSESS DOC 0-1 FALLS W/OUT INJ PAST YR: ICD-10-PCS | Mod: HCNC,CPTII,S$GLB, | Performed by: HOSPITALIST

## 2020-10-21 PROCEDURE — 3008F BODY MASS INDEX DOCD: CPT | Mod: HCNC,CPTII,S$GLB, | Performed by: HOSPITALIST

## 2020-10-21 PROCEDURE — 3078F DIAST BP <80 MM HG: CPT | Mod: HCNC,CPTII,S$GLB, | Performed by: HOSPITALIST

## 2020-10-21 PROCEDURE — 99999 PR PBB SHADOW E&M-EST. PATIENT-LVL III: CPT | Mod: PBBFAC,HCNC,, | Performed by: HOSPITALIST

## 2020-10-21 PROCEDURE — 1159F MED LIST DOCD IN RCRD: CPT | Mod: HCNC,S$GLB,, | Performed by: HOSPITALIST

## 2020-10-21 PROCEDURE — 1126F PR PAIN SEVERITY QUANTIFIED, NO PAIN PRESENT: ICD-10-PCS | Mod: HCNC,S$GLB,, | Performed by: HOSPITALIST

## 2020-10-21 PROCEDURE — 3008F PR BODY MASS INDEX (BMI) DOCUMENTED: ICD-10-PCS | Mod: HCNC,CPTII,S$GLB, | Performed by: HOSPITALIST

## 2020-10-21 PROCEDURE — 99999 PR PBB SHADOW E&M-EST. PATIENT-LVL III: ICD-10-PCS | Mod: PBBFAC,HCNC,, | Performed by: HOSPITALIST

## 2020-10-21 PROCEDURE — 99213 OFFICE O/P EST LOW 20 MIN: CPT | Mod: HCNC,S$GLB,, | Performed by: HOSPITALIST

## 2020-10-21 RX ORDER — OLMESARTAN MEDOXOMIL 20 MG/1
20 TABLET ORAL DAILY
Qty: 30 TABLET | Refills: 1 | Status: SHIPPED | OUTPATIENT
Start: 2020-10-21 | End: 2021-05-27

## 2020-10-21 NOTE — PROGRESS NOTES
Subjective:     @Patient ID: Tressa Quiroz is a 70 y.o. female.    Chief Complaint: Hypertension    HPI    71 yo F with HTN presents for bp check. Pt was restarted on amlodipine in May and was to RTC in 2 weeks. Did not at that time.  Reports recently having vision changes and stopped taking amlodipine. States she is feeling better. Denies any symptoms currently.    Reports she will f/u with her eye doctor.  She states she has researched olmesartan and is willing to try that      Review of Systems   Constitutional: Negative for chills and fever.   HENT: Negative for congestion and sore throat.    Eyes: Negative for pain and visual disturbance.   Respiratory: Negative for cough and shortness of breath.    Cardiovascular: Negative for chest pain and leg swelling.   Gastrointestinal: Negative for abdominal pain, nausea and vomiting.   Endocrine: Negative for polydipsia and polyuria.   Genitourinary: Negative for difficulty urinating and dysuria.   Musculoskeletal: Negative for arthralgias and back pain.   Skin: Negative for rash and wound.   Neurological: Negative for dizziness, weakness and headaches.   Psychiatric/Behavioral: Negative for agitation and confusion.     Past medical history, surgical history, and family medical history reviewed and updated as appropriate.    Medications and allergies reviewed.      Objective:     Vitals:    10/21/20 1245 10/21/20 1310   BP: (!) 182/70 (!) 180/70   BP Location: Left arm    Patient Position: Sitting    BP Method: Medium (Manual)    Pulse: 64    Resp: 14    Temp: 97.9 °F (36.6 °C)    TempSrc: Temporal    Weight: 54.7 kg (120 lb 9.5 oz)    Height: 5' (1.524 m)      Body mass index is 23.55 kg/m².  Physical Exam  Vitals signs reviewed.   Constitutional:       General: She is not in acute distress.     Appearance: She is well-developed.   HENT:      Head: Normocephalic and atraumatic.   Eyes:      General:         Right eye: No discharge.         Left eye: No discharge.       Conjunctiva/sclera: Conjunctivae normal.   Neck:      Musculoskeletal: Normal range of motion and neck supple.   Cardiovascular:      Rate and Rhythm: Normal rate and regular rhythm.      Heart sounds: No murmur. No friction rub.   Pulmonary:      Effort: Pulmonary effort is normal.      Breath sounds: Normal breath sounds.   Musculoskeletal: Normal range of motion.   Skin:     General: Skin is warm and dry.   Neurological:      Mental Status: She is alert and oriented to person, place, and time.   Psychiatric:         Mood and Affect: Mood normal.         Behavior: Behavior normal.         Lab Results   Component Value Date    WBC 8.63 05/18/2020    HGB 12.3 05/18/2020    HCT 39.9 05/18/2020     05/18/2020    CHOL 240 (H) 05/18/2020    TRIG 134 05/18/2020    HDL 59 05/18/2020    ALT 14 05/18/2020    AST 19 05/18/2020     05/18/2020    K 4.4 05/18/2020     05/18/2020    CREATININE 0.7 05/18/2020    BUN 15 05/18/2020    CO2 26 05/18/2020    TSH 3.666 10/14/2020       Assessment:     1. Essential hypertension      Plan:   Tressa was seen today for hypertension.    Diagnoses and all orders for this visit:    Essential hypertension        - bp uncontrolled. Pt is asymptomatic currently. D/c amlodipine. Start olmesartan   -     olmesartan (BENICAR) 20 MG tablet; Take 1 tablet (20 mg total) by mouth once daily.        RTC 2 weeks for bp check     Steffi Cerda MD  Internal Medicine    10/21/2020

## 2020-10-22 ENCOUNTER — TELEPHONE (OUTPATIENT)
Dept: INTERNAL MEDICINE | Facility: CLINIC | Age: 70
End: 2020-10-22

## 2020-10-22 NOTE — TELEPHONE ENCOUNTER
----- Message from Steffi Cerda MD sent at 10/21/2020 11:10 PM CDT -----  Please notify pt that FIT test is negative.

## 2020-10-22 NOTE — TELEPHONE ENCOUNTER
----- Message from Kelsi Blackmon sent at 10/22/2020 10:26 AM CDT -----  Regarding: returning call  Contact: [email or web address]@[phone]  Patient Returning Call from [facility]    Who Left Message for Patient: Marcelina    Communication Preference: [phone]    Additional Information:   Pt was returning a missed call and requesting a call back.

## 2020-11-10 RX ORDER — LEVOTHYROXINE SODIUM 50 UG/1
TABLET ORAL
Qty: 30 TABLET | Refills: 5 | Status: SHIPPED | OUTPATIENT
Start: 2020-11-10 | End: 2021-05-12

## 2020-11-19 ENCOUNTER — TELEPHONE (OUTPATIENT)
Dept: INTERNAL MEDICINE | Facility: CLINIC | Age: 70
End: 2020-11-19

## 2020-11-20 LAB
LEFT EYE DM RETINOPATHY: NEGATIVE
RIGHT EYE DM RETINOPATHY: NEGATIVE

## 2021-03-30 ENCOUNTER — TELEPHONE (OUTPATIENT)
Dept: INTERNAL MEDICINE | Facility: CLINIC | Age: 71
End: 2021-03-30

## 2021-03-30 DIAGNOSIS — E06.3 HYPOTHYROIDISM DUE TO HASHIMOTO'S THYROIDITIS: ICD-10-CM

## 2021-03-30 DIAGNOSIS — I10 ESSENTIAL HYPERTENSION: Primary | ICD-10-CM

## 2021-03-30 DIAGNOSIS — E03.8 HYPOTHYROIDISM DUE TO HASHIMOTO'S THYROIDITIS: ICD-10-CM

## 2021-04-03 DIAGNOSIS — I10 ESSENTIAL HYPERTENSION: Primary | ICD-10-CM

## 2021-05-20 ENCOUNTER — LAB VISIT (OUTPATIENT)
Dept: LAB | Facility: HOSPITAL | Age: 71
End: 2021-05-20
Attending: HOSPITALIST
Payer: MEDICARE

## 2021-05-20 DIAGNOSIS — E06.3 HYPOTHYROIDISM DUE TO HASHIMOTO'S THYROIDITIS: ICD-10-CM

## 2021-05-20 DIAGNOSIS — I10 ESSENTIAL HYPERTENSION: ICD-10-CM

## 2021-05-20 DIAGNOSIS — E03.8 HYPOTHYROIDISM DUE TO HASHIMOTO'S THYROIDITIS: ICD-10-CM

## 2021-05-20 LAB
ALBUMIN SERPL BCP-MCNC: 3.2 G/DL (ref 3.5–5.2)
ALP SERPL-CCNC: 105 U/L (ref 55–135)
ALT SERPL W/O P-5'-P-CCNC: 13 U/L (ref 10–44)
ANION GAP SERPL CALC-SCNC: 6 MMOL/L (ref 8–16)
AST SERPL-CCNC: 20 U/L (ref 10–40)
BASOPHILS # BLD AUTO: 0.06 K/UL (ref 0–0.2)
BASOPHILS NFR BLD: 0.7 % (ref 0–1.9)
BILIRUB SERPL-MCNC: 0.4 MG/DL (ref 0.1–1)
BUN SERPL-MCNC: 10 MG/DL (ref 8–23)
CALCIUM SERPL-MCNC: 9.2 MG/DL (ref 8.7–10.5)
CHLORIDE SERPL-SCNC: 107 MMOL/L (ref 95–110)
CHOLEST SERPL-MCNC: 267 MG/DL (ref 120–199)
CHOLEST/HDLC SERPL: 4.5 {RATIO} (ref 2–5)
CO2 SERPL-SCNC: 27 MMOL/L (ref 23–29)
CREAT SERPL-MCNC: 0.7 MG/DL (ref 0.5–1.4)
DIFFERENTIAL METHOD: ABNORMAL
EOSINOPHIL # BLD AUTO: 0.3 K/UL (ref 0–0.5)
EOSINOPHIL NFR BLD: 3.9 % (ref 0–8)
ERYTHROCYTE [DISTWIDTH] IN BLOOD BY AUTOMATED COUNT: 14.6 % (ref 11.5–14.5)
EST. GFR  (AFRICAN AMERICAN): >60 ML/MIN/1.73 M^2
EST. GFR  (NON AFRICAN AMERICAN): >60 ML/MIN/1.73 M^2
GLUCOSE SERPL-MCNC: 82 MG/DL (ref 70–110)
HCT VFR BLD AUTO: 38.9 % (ref 37–48.5)
HDLC SERPL-MCNC: 59 MG/DL (ref 40–75)
HDLC SERPL: 22.1 % (ref 20–50)
HGB BLD-MCNC: 12.5 G/DL (ref 12–16)
IMM GRANULOCYTES # BLD AUTO: 0.04 K/UL (ref 0–0.04)
IMM GRANULOCYTES NFR BLD AUTO: 0.5 % (ref 0–0.5)
LDLC SERPL CALC-MCNC: 191 MG/DL (ref 63–159)
LYMPHOCYTES # BLD AUTO: 3.5 K/UL (ref 1–4.8)
LYMPHOCYTES NFR BLD: 42.8 % (ref 18–48)
MCH RBC QN AUTO: 30.1 PG (ref 27–31)
MCHC RBC AUTO-ENTMCNC: 32.1 G/DL (ref 32–36)
MCV RBC AUTO: 94 FL (ref 82–98)
MONOCYTES # BLD AUTO: 0.6 K/UL (ref 0.3–1)
MONOCYTES NFR BLD: 7.3 % (ref 4–15)
NEUTROPHILS # BLD AUTO: 3.7 K/UL (ref 1.8–7.7)
NEUTROPHILS NFR BLD: 44.8 % (ref 38–73)
NONHDLC SERPL-MCNC: 208 MG/DL
NRBC BLD-RTO: 0 /100 WBC
PLATELET # BLD AUTO: 239 K/UL (ref 150–450)
PMV BLD AUTO: 11.4 FL (ref 9.2–12.9)
POTASSIUM SERPL-SCNC: 4.2 MMOL/L (ref 3.5–5.1)
PROT SERPL-MCNC: 6.9 G/DL (ref 6–8.4)
RBC # BLD AUTO: 4.15 M/UL (ref 4–5.4)
SODIUM SERPL-SCNC: 140 MMOL/L (ref 136–145)
TRIGL SERPL-MCNC: 85 MG/DL (ref 30–150)
TSH SERPL DL<=0.005 MIU/L-ACNC: 2.3 UIU/ML (ref 0.4–4)
WBC # BLD AUTO: 8.27 K/UL (ref 3.9–12.7)

## 2021-05-20 PROCEDURE — 85025 COMPLETE CBC W/AUTO DIFF WBC: CPT | Performed by: HOSPITALIST

## 2021-05-20 PROCEDURE — 84443 ASSAY THYROID STIM HORMONE: CPT | Performed by: HOSPITALIST

## 2021-05-20 PROCEDURE — 80061 LIPID PANEL: CPT | Performed by: HOSPITALIST

## 2021-05-20 PROCEDURE — 36415 COLL VENOUS BLD VENIPUNCTURE: CPT | Mod: PO | Performed by: HOSPITALIST

## 2021-05-20 PROCEDURE — 80053 COMPREHEN METABOLIC PANEL: CPT | Performed by: HOSPITALIST

## 2021-05-27 ENCOUNTER — OFFICE VISIT (OUTPATIENT)
Dept: INTERNAL MEDICINE | Facility: CLINIC | Age: 71
End: 2021-05-27
Payer: MEDICARE

## 2021-05-27 VITALS
DIASTOLIC BLOOD PRESSURE: 72 MMHG | TEMPERATURE: 98 F | HEART RATE: 76 BPM | HEIGHT: 60 IN | WEIGHT: 123 LBS | RESPIRATION RATE: 15 BRPM | SYSTOLIC BLOOD PRESSURE: 166 MMHG | BODY MASS INDEX: 24.15 KG/M2

## 2021-05-27 DIAGNOSIS — E06.3 HYPOTHYROIDISM DUE TO HASHIMOTO'S THYROIDITIS: ICD-10-CM

## 2021-05-27 DIAGNOSIS — Z00.00 ENCOUNTER FOR PREVENTIVE HEALTH EXAMINATION: Primary | ICD-10-CM

## 2021-05-27 DIAGNOSIS — E03.8 HYPOTHYROIDISM DUE TO HASHIMOTO'S THYROIDITIS: ICD-10-CM

## 2021-05-27 DIAGNOSIS — Z78.0 POST-MENOPAUSAL: ICD-10-CM

## 2021-05-27 DIAGNOSIS — I10 ESSENTIAL HYPERTENSION: ICD-10-CM

## 2021-05-27 DIAGNOSIS — Z12.11 SCREENING FOR COLORECTAL CANCER: ICD-10-CM

## 2021-05-27 DIAGNOSIS — Z12.12 SCREENING FOR COLORECTAL CANCER: ICD-10-CM

## 2021-05-27 PROCEDURE — 1126F PR PAIN SEVERITY QUANTIFIED, NO PAIN PRESENT: ICD-10-PCS | Mod: S$GLB,,, | Performed by: HOSPITALIST

## 2021-05-27 PROCEDURE — 1101F PT FALLS ASSESS-DOCD LE1/YR: CPT | Mod: CPTII,S$GLB,, | Performed by: HOSPITALIST

## 2021-05-27 PROCEDURE — 3008F PR BODY MASS INDEX (BMI) DOCUMENTED: ICD-10-PCS | Mod: CPTII,S$GLB,, | Performed by: HOSPITALIST

## 2021-05-27 PROCEDURE — 3008F BODY MASS INDEX DOCD: CPT | Mod: CPTII,S$GLB,, | Performed by: HOSPITALIST

## 2021-05-27 PROCEDURE — 99499 UNLISTED E&M SERVICE: CPT | Mod: S$GLB,,, | Performed by: HOSPITALIST

## 2021-05-27 PROCEDURE — 99397 PR PREVENTIVE VISIT,EST,65 & OVER: ICD-10-PCS | Mod: S$GLB,,, | Performed by: HOSPITALIST

## 2021-05-27 PROCEDURE — 1126F AMNT PAIN NOTED NONE PRSNT: CPT | Mod: S$GLB,,, | Performed by: HOSPITALIST

## 2021-05-27 PROCEDURE — 3288F FALL RISK ASSESSMENT DOCD: CPT | Mod: CPTII,S$GLB,, | Performed by: HOSPITALIST

## 2021-05-27 PROCEDURE — 3288F PR FALLS RISK ASSESSMENT DOCUMENTED: ICD-10-PCS | Mod: CPTII,S$GLB,, | Performed by: HOSPITALIST

## 2021-05-27 PROCEDURE — 99999 PR PBB SHADOW E&M-EST. PATIENT-LVL IV: CPT | Mod: PBBFAC,,, | Performed by: HOSPITALIST

## 2021-05-27 PROCEDURE — 99499 RISK ADDL DX/OHS AUDIT: ICD-10-PCS | Mod: S$GLB,,, | Performed by: HOSPITALIST

## 2021-05-27 PROCEDURE — 99397 PER PM REEVAL EST PAT 65+ YR: CPT | Mod: S$GLB,,, | Performed by: HOSPITALIST

## 2021-05-27 PROCEDURE — 1101F PR PT FALLS ASSESS DOC 0-1 FALLS W/OUT INJ PAST YR: ICD-10-PCS | Mod: CPTII,S$GLB,, | Performed by: HOSPITALIST

## 2021-05-27 PROCEDURE — 99999 PR PBB SHADOW E&M-EST. PATIENT-LVL IV: ICD-10-PCS | Mod: PBBFAC,,, | Performed by: HOSPITALIST

## 2021-05-27 RX ORDER — LOSARTAN POTASSIUM 50 MG/1
50 TABLET ORAL DAILY
Qty: 30 TABLET | Refills: 11 | Status: SHIPPED | OUTPATIENT
Start: 2021-05-27 | End: 2021-06-02

## 2021-05-29 ENCOUNTER — OFFICE VISIT (OUTPATIENT)
Dept: URGENT CARE | Facility: CLINIC | Age: 71
End: 2021-05-29
Payer: MEDICARE

## 2021-05-29 VITALS
WEIGHT: 123 LBS | OXYGEN SATURATION: 96 % | RESPIRATION RATE: 17 BRPM | DIASTOLIC BLOOD PRESSURE: 82 MMHG | HEART RATE: 87 BPM | HEIGHT: 60 IN | TEMPERATURE: 98 F | BODY MASS INDEX: 24.15 KG/M2 | SYSTOLIC BLOOD PRESSURE: 165 MMHG

## 2021-05-29 DIAGNOSIS — I10 ESSENTIAL HYPERTENSION: Primary | ICD-10-CM

## 2021-05-29 PROCEDURE — 3077F PR MOST RECENT SYSTOLIC BLOOD PRESSURE >= 140 MM HG: ICD-10-PCS | Mod: CPTII,S$GLB,, | Performed by: NURSE PRACTITIONER

## 2021-05-29 PROCEDURE — 3079F DIAST BP 80-89 MM HG: CPT | Mod: CPTII,S$GLB,, | Performed by: NURSE PRACTITIONER

## 2021-05-29 PROCEDURE — 3008F BODY MASS INDEX DOCD: CPT | Mod: CPTII,S$GLB,, | Performed by: NURSE PRACTITIONER

## 2021-05-29 PROCEDURE — 99213 PR OFFICE/OUTPT VISIT, EST, LEVL III, 20-29 MIN: ICD-10-PCS | Mod: S$GLB,,, | Performed by: NURSE PRACTITIONER

## 2021-05-29 PROCEDURE — 3077F SYST BP >= 140 MM HG: CPT | Mod: CPTII,S$GLB,, | Performed by: NURSE PRACTITIONER

## 2021-05-29 PROCEDURE — 3079F PR MOST RECENT DIASTOLIC BLOOD PRESSURE 80-89 MM HG: ICD-10-PCS | Mod: CPTII,S$GLB,, | Performed by: NURSE PRACTITIONER

## 2021-05-29 PROCEDURE — 3008F PR BODY MASS INDEX (BMI) DOCUMENTED: ICD-10-PCS | Mod: CPTII,S$GLB,, | Performed by: NURSE PRACTITIONER

## 2021-05-29 PROCEDURE — 99213 OFFICE O/P EST LOW 20 MIN: CPT | Mod: S$GLB,,, | Performed by: NURSE PRACTITIONER

## 2021-05-30 ENCOUNTER — NURSE TRIAGE (OUTPATIENT)
Dept: ADMINISTRATIVE | Facility: CLINIC | Age: 71
End: 2021-05-30

## 2021-05-31 ENCOUNTER — PATIENT OUTREACH (OUTPATIENT)
Dept: ADMINISTRATIVE | Facility: HOSPITAL | Age: 71
End: 2021-05-31

## 2021-05-31 ENCOUNTER — TELEPHONE (OUTPATIENT)
Dept: INTERNAL MEDICINE | Facility: CLINIC | Age: 71
End: 2021-05-31

## 2021-06-02 ENCOUNTER — TELEPHONE (OUTPATIENT)
Dept: INTERNAL MEDICINE | Facility: CLINIC | Age: 71
End: 2021-06-02

## 2021-06-02 ENCOUNTER — OFFICE VISIT (OUTPATIENT)
Dept: INTERNAL MEDICINE | Facility: CLINIC | Age: 71
End: 2021-06-02
Payer: MEDICARE

## 2021-06-02 VITALS
TEMPERATURE: 97 F | WEIGHT: 119.69 LBS | OXYGEN SATURATION: 97 % | DIASTOLIC BLOOD PRESSURE: 80 MMHG | HEIGHT: 60 IN | BODY MASS INDEX: 23.5 KG/M2 | SYSTOLIC BLOOD PRESSURE: 160 MMHG | HEART RATE: 79 BPM

## 2021-06-02 DIAGNOSIS — I10 ESSENTIAL HYPERTENSION: Primary | ICD-10-CM

## 2021-06-02 DIAGNOSIS — F32.A DEPRESSION, UNSPECIFIED DEPRESSION TYPE: ICD-10-CM

## 2021-06-02 DIAGNOSIS — G47.00 INSOMNIA, UNSPECIFIED TYPE: ICD-10-CM

## 2021-06-02 PROCEDURE — 3008F BODY MASS INDEX DOCD: CPT | Mod: CPTII,S$GLB,, | Performed by: HOSPITALIST

## 2021-06-02 PROCEDURE — 3008F PR BODY MASS INDEX (BMI) DOCUMENTED: ICD-10-PCS | Mod: CPTII,S$GLB,, | Performed by: HOSPITALIST

## 2021-06-02 PROCEDURE — 99999 PR PBB SHADOW E&M-EST. PATIENT-LVL III: ICD-10-PCS | Mod: PBBFAC,,, | Performed by: HOSPITALIST

## 2021-06-02 PROCEDURE — 3079F DIAST BP 80-89 MM HG: CPT | Mod: CPTII,S$GLB,, | Performed by: HOSPITALIST

## 2021-06-02 PROCEDURE — 3079F PR MOST RECENT DIASTOLIC BLOOD PRESSURE 80-89 MM HG: ICD-10-PCS | Mod: CPTII,S$GLB,, | Performed by: HOSPITALIST

## 2021-06-02 PROCEDURE — 3077F SYST BP >= 140 MM HG: CPT | Mod: CPTII,S$GLB,, | Performed by: HOSPITALIST

## 2021-06-02 PROCEDURE — 3077F PR MOST RECENT SYSTOLIC BLOOD PRESSURE >= 140 MM HG: ICD-10-PCS | Mod: CPTII,S$GLB,, | Performed by: HOSPITALIST

## 2021-06-02 PROCEDURE — 1126F PR PAIN SEVERITY QUANTIFIED, NO PAIN PRESENT: ICD-10-PCS | Mod: S$GLB,,, | Performed by: HOSPITALIST

## 2021-06-02 PROCEDURE — 99999 PR PBB SHADOW E&M-EST. PATIENT-LVL III: CPT | Mod: PBBFAC,,, | Performed by: HOSPITALIST

## 2021-06-02 PROCEDURE — 1159F MED LIST DOCD IN RCRD: CPT | Mod: S$GLB,,, | Performed by: HOSPITALIST

## 2021-06-02 PROCEDURE — 1101F PT FALLS ASSESS-DOCD LE1/YR: CPT | Mod: CPTII,S$GLB,, | Performed by: HOSPITALIST

## 2021-06-02 PROCEDURE — 99214 OFFICE O/P EST MOD 30 MIN: CPT | Mod: S$GLB,,, | Performed by: HOSPITALIST

## 2021-06-02 PROCEDURE — 3288F PR FALLS RISK ASSESSMENT DOCUMENTED: ICD-10-PCS | Mod: CPTII,S$GLB,, | Performed by: HOSPITALIST

## 2021-06-02 PROCEDURE — 1159F PR MEDICATION LIST DOCUMENTED IN MEDICAL RECORD: ICD-10-PCS | Mod: S$GLB,,, | Performed by: HOSPITALIST

## 2021-06-02 PROCEDURE — 1101F PR PT FALLS ASSESS DOC 0-1 FALLS W/OUT INJ PAST YR: ICD-10-PCS | Mod: CPTII,S$GLB,, | Performed by: HOSPITALIST

## 2021-06-02 PROCEDURE — 1126F AMNT PAIN NOTED NONE PRSNT: CPT | Mod: S$GLB,,, | Performed by: HOSPITALIST

## 2021-06-02 PROCEDURE — 3288F FALL RISK ASSESSMENT DOCD: CPT | Mod: CPTII,S$GLB,, | Performed by: HOSPITALIST

## 2021-06-02 PROCEDURE — 99214 PR OFFICE/OUTPT VISIT, EST, LEVL IV, 30-39 MIN: ICD-10-PCS | Mod: S$GLB,,, | Performed by: HOSPITALIST

## 2021-06-02 RX ORDER — LEVOTHYROXINE SODIUM 50 UG/1
TABLET ORAL
COMMUNITY

## 2021-06-02 RX ORDER — LOSARTAN POTASSIUM 50 MG/1
50 TABLET ORAL 2 TIMES DAILY
Qty: 60 TABLET | Refills: 11 | Status: SHIPPED | OUTPATIENT
Start: 2021-06-02 | End: 2022-06-02

## 2021-06-02 RX ORDER — LOSARTAN POTASSIUM 50 MG/1
50 TABLET ORAL 2 TIMES DAILY
Qty: 60 TABLET | Refills: 11
Start: 2021-06-02 | End: 2021-06-02

## 2021-06-02 RX ORDER — TRAZODONE HYDROCHLORIDE 50 MG/1
50 TABLET ORAL NIGHTLY
Qty: 30 TABLET | Refills: 2 | Status: SHIPPED | OUTPATIENT
Start: 2021-06-02 | End: 2021-06-09

## 2021-06-07 ENCOUNTER — PATIENT OUTREACH (OUTPATIENT)
Dept: ADMINISTRATIVE | Facility: HOSPITAL | Age: 71
End: 2021-06-07

## 2021-06-07 ENCOUNTER — TELEPHONE (OUTPATIENT)
Dept: INTERNAL MEDICINE | Facility: CLINIC | Age: 71
End: 2021-06-07

## 2021-06-09 ENCOUNTER — TELEPHONE (OUTPATIENT)
Dept: INTERNAL MEDICINE | Facility: CLINIC | Age: 71
End: 2021-06-09

## 2021-06-09 RX ORDER — TRAZODONE HYDROCHLORIDE 50 MG/1
25 TABLET ORAL NIGHTLY
Qty: 30 TABLET | Refills: 2
Start: 2021-06-09 | End: 2022-06-09

## 2021-06-22 ENCOUNTER — PES CALL (OUTPATIENT)
Dept: ADMINISTRATIVE | Facility: CLINIC | Age: 71
End: 2021-06-22

## 2021-06-22 LAB — NONINV COLON CA DNA+OCC BLD SCRN STL QL: NEGATIVE

## 2021-07-22 ENCOUNTER — PATIENT OUTREACH (OUTPATIENT)
Dept: ADMINISTRATIVE | Facility: HOSPITAL | Age: 71
End: 2021-07-22

## 2021-07-29 ENCOUNTER — PATIENT OUTREACH (OUTPATIENT)
Dept: ADMINISTRATIVE | Facility: HOSPITAL | Age: 71
End: 2021-07-29

## 2022-02-18 ENCOUNTER — PATIENT OUTREACH (OUTPATIENT)
Dept: ADMINISTRATIVE | Facility: HOSPITAL | Age: 72
End: 2022-02-18
Payer: MEDICARE

## 2022-07-27 ENCOUNTER — PATIENT OUTREACH (OUTPATIENT)
Dept: ADMINISTRATIVE | Facility: HOSPITAL | Age: 72
End: 2022-07-27
Payer: MEDICARE
